# Patient Record
Sex: MALE | Race: WHITE | NOT HISPANIC OR LATINO | Employment: OTHER | URBAN - METROPOLITAN AREA
[De-identification: names, ages, dates, MRNs, and addresses within clinical notes are randomized per-mention and may not be internally consistent; named-entity substitution may affect disease eponyms.]

---

## 2024-08-23 ENCOUNTER — HOSPITAL ENCOUNTER (EMERGENCY)
Facility: HOSPITAL | Age: 69
Discharge: OTHER INSTITUTION | End: 2024-08-24
Attending: STUDENT IN AN ORGANIZED HEALTH CARE EDUCATION/TRAINING PROGRAM
Payer: MEDICARE

## 2024-08-23 ENCOUNTER — APPOINTMENT (OUTPATIENT)
Dept: CARDIOLOGY | Facility: HOSPITAL | Age: 69
End: 2024-08-23
Payer: MEDICARE

## 2024-08-23 ENCOUNTER — APPOINTMENT (OUTPATIENT)
Dept: RADIOLOGY | Facility: HOSPITAL | Age: 69
End: 2024-08-23
Payer: MEDICARE

## 2024-08-23 DIAGNOSIS — U07.1 COVID-19: ICD-10-CM

## 2024-08-23 DIAGNOSIS — G40.901 STATUS EPILEPTICUS (MULTI): Primary | ICD-10-CM

## 2024-08-23 LAB
AMPHETAMINES UR QL SCN: NORMAL
ANION GAP SERPL CALC-SCNC: 12 MMOL/L (ref 10–20)
APPEARANCE UR: CLEAR
BACTERIA #/AREA URNS AUTO: ABNORMAL /HPF
BARBITURATES UR QL SCN: NORMAL
BASOPHILS # BLD AUTO: 0.05 X10*3/UL (ref 0–0.1)
BASOPHILS NFR BLD AUTO: 0.7 %
BENZODIAZ UR QL SCN: NORMAL
BILIRUB UR STRIP.AUTO-MCNC: NEGATIVE MG/DL
BUN SERPL-MCNC: 13 MG/DL (ref 6–23)
BZE UR QL SCN: NORMAL
CALCIUM SERPL-MCNC: 9.4 MG/DL (ref 8.6–10.3)
CANNABINOIDS UR QL SCN: NORMAL
CARDIAC TROPONIN I PNL SERPL HS: 5 NG/L (ref 0–20)
CHLORIDE SERPL-SCNC: 107 MMOL/L (ref 98–107)
CO2 SERPL-SCNC: 25 MMOL/L (ref 21–32)
COLOR UR: YELLOW
CREAT SERPL-MCNC: 1.12 MG/DL (ref 0.5–1.3)
EGFRCR SERPLBLD CKD-EPI 2021: 71 ML/MIN/1.73M*2
EOSINOPHIL # BLD AUTO: 0.27 X10*3/UL (ref 0–0.7)
EOSINOPHIL NFR BLD AUTO: 4 %
ERYTHROCYTE [DISTWIDTH] IN BLOOD BY AUTOMATED COUNT: 13.2 % (ref 11.5–14.5)
ETHANOL SERPL-MCNC: <10 MG/DL
FENTANYL+NORFENTANYL UR QL SCN: NORMAL
GLUCOSE SERPL-MCNC: 87 MG/DL (ref 74–99)
GLUCOSE UR STRIP.AUTO-MCNC: NORMAL MG/DL
HCT VFR BLD AUTO: 42.3 % (ref 41–52)
HGB BLD-MCNC: 13.8 G/DL (ref 13.5–17.5)
HYALINE CASTS #/AREA URNS AUTO: ABNORMAL /LPF
IMM GRANULOCYTES # BLD AUTO: 0.02 X10*3/UL (ref 0–0.7)
IMM GRANULOCYTES NFR BLD AUTO: 0.3 % (ref 0–0.9)
KETONES UR STRIP.AUTO-MCNC: NEGATIVE MG/DL
LACTATE SERPL-SCNC: 1.4 MMOL/L (ref 0.4–2)
LEUKOCYTE ESTERASE UR QL STRIP.AUTO: ABNORMAL
LYMPHOCYTES # BLD AUTO: 2.57 X10*3/UL (ref 1.2–4.8)
LYMPHOCYTES NFR BLD AUTO: 37.7 %
MCH RBC QN AUTO: 28.9 PG (ref 26–34)
MCHC RBC AUTO-ENTMCNC: 32.6 G/DL (ref 32–36)
MCV RBC AUTO: 89 FL (ref 80–100)
METHADONE UR QL SCN: NORMAL
MONOCYTES # BLD AUTO: 0.66 X10*3/UL (ref 0.1–1)
MONOCYTES NFR BLD AUTO: 9.7 %
MUCOUS THREADS #/AREA URNS AUTO: ABNORMAL /LPF
NEUTROPHILS # BLD AUTO: 3.24 X10*3/UL (ref 1.2–7.7)
NEUTROPHILS NFR BLD AUTO: 47.6 %
NITRITE UR QL STRIP.AUTO: NEGATIVE
NRBC BLD-RTO: 0 /100 WBCS (ref 0–0)
OPIATES UR QL SCN: NORMAL
OXYCODONE+OXYMORPHONE UR QL SCN: NORMAL
PCP UR QL SCN: NORMAL
PH UR STRIP.AUTO: 5 [PH]
PLATELET # BLD AUTO: 238 X10*3/UL (ref 150–450)
POTASSIUM SERPL-SCNC: 4.1 MMOL/L (ref 3.5–5.3)
PROT UR STRIP.AUTO-MCNC: NEGATIVE MG/DL
RBC # BLD AUTO: 4.77 X10*6/UL (ref 4.5–5.9)
RBC # UR STRIP.AUTO: NEGATIVE /UL
RBC #/AREA URNS AUTO: ABNORMAL /HPF
SARS-COV-2 RNA RESP QL NAA+PROBE: DETECTED
SODIUM SERPL-SCNC: 140 MMOL/L (ref 136–145)
SP GR UR STRIP.AUTO: 1.02
SQUAMOUS #/AREA URNS AUTO: ABNORMAL /HPF
UROBILINOGEN UR STRIP.AUTO-MCNC: ABNORMAL MG/DL
WBC # BLD AUTO: 6.8 X10*3/UL (ref 4.4–11.3)
WBC #/AREA URNS AUTO: ABNORMAL /HPF

## 2024-08-23 PROCEDURE — 72125 CT NECK SPINE W/O DYE: CPT

## 2024-08-23 PROCEDURE — 84484 ASSAY OF TROPONIN QUANT: CPT | Performed by: STUDENT IN AN ORGANIZED HEALTH CARE EDUCATION/TRAINING PROGRAM

## 2024-08-23 PROCEDURE — 81001 URINALYSIS AUTO W/SCOPE: CPT | Performed by: STUDENT IN AN ORGANIZED HEALTH CARE EDUCATION/TRAINING PROGRAM

## 2024-08-23 PROCEDURE — 85025 COMPLETE CBC W/AUTO DIFF WBC: CPT | Performed by: STUDENT IN AN ORGANIZED HEALTH CARE EDUCATION/TRAINING PROGRAM

## 2024-08-23 PROCEDURE — 36415 COLL VENOUS BLD VENIPUNCTURE: CPT | Performed by: STUDENT IN AN ORGANIZED HEALTH CARE EDUCATION/TRAINING PROGRAM

## 2024-08-23 PROCEDURE — 80048 BASIC METABOLIC PNL TOTAL CA: CPT | Performed by: STUDENT IN AN ORGANIZED HEALTH CARE EDUCATION/TRAINING PROGRAM

## 2024-08-23 PROCEDURE — 70450 CT HEAD/BRAIN W/O DYE: CPT

## 2024-08-23 PROCEDURE — 72125 CT NECK SPINE W/O DYE: CPT | Performed by: RADIOLOGY

## 2024-08-23 PROCEDURE — 99291 CRITICAL CARE FIRST HOUR: CPT | Mod: 25

## 2024-08-23 PROCEDURE — 70450 CT HEAD/BRAIN W/O DYE: CPT | Performed by: RADIOLOGY

## 2024-08-23 PROCEDURE — 96375 TX/PRO/DX INJ NEW DRUG ADDON: CPT

## 2024-08-23 PROCEDURE — 2500000004 HC RX 250 GENERAL PHARMACY W/ HCPCS (ALT 636 FOR OP/ED): Performed by: STUDENT IN AN ORGANIZED HEALTH CARE EDUCATION/TRAINING PROGRAM

## 2024-08-23 PROCEDURE — 99291 CRITICAL CARE FIRST HOUR: CPT | Performed by: STUDENT IN AN ORGANIZED HEALTH CARE EDUCATION/TRAINING PROGRAM

## 2024-08-23 PROCEDURE — 99285 EMERGENCY DEPT VISIT HI MDM: CPT | Mod: 25

## 2024-08-23 PROCEDURE — 80307 DRUG TEST PRSMV CHEM ANLYZR: CPT | Performed by: STUDENT IN AN ORGANIZED HEALTH CARE EDUCATION/TRAINING PROGRAM

## 2024-08-23 PROCEDURE — 96372 THER/PROPH/DIAG INJ SC/IM: CPT | Performed by: STUDENT IN AN ORGANIZED HEALTH CARE EDUCATION/TRAINING PROGRAM

## 2024-08-23 PROCEDURE — 93005 ELECTROCARDIOGRAM TRACING: CPT

## 2024-08-23 PROCEDURE — 96374 THER/PROPH/DIAG INJ IV PUSH: CPT

## 2024-08-23 PROCEDURE — 83605 ASSAY OF LACTIC ACID: CPT | Performed by: STUDENT IN AN ORGANIZED HEALTH CARE EDUCATION/TRAINING PROGRAM

## 2024-08-23 PROCEDURE — 82077 ASSAY SPEC XCP UR&BREATH IA: CPT | Performed by: STUDENT IN AN ORGANIZED HEALTH CARE EDUCATION/TRAINING PROGRAM

## 2024-08-23 PROCEDURE — 87086 URINE CULTURE/COLONY COUNT: CPT | Mod: GEALAB | Performed by: STUDENT IN AN ORGANIZED HEALTH CARE EDUCATION/TRAINING PROGRAM

## 2024-08-23 PROCEDURE — 87635 SARS-COV-2 COVID-19 AMP PRB: CPT | Performed by: STUDENT IN AN ORGANIZED HEALTH CARE EDUCATION/TRAINING PROGRAM

## 2024-08-23 RX ORDER — LEVETIRACETAM 10 MG/ML
1000 INJECTION INTRAVASCULAR ONCE
Status: COMPLETED | OUTPATIENT
Start: 2024-08-23 | End: 2024-08-23

## 2024-08-23 RX ORDER — LORAZEPAM 2 MG/ML
INJECTION INTRAMUSCULAR
Status: DISCONTINUED
Start: 2024-08-23 | End: 2024-08-24 | Stop reason: HOSPADM

## 2024-08-23 RX ORDER — LORAZEPAM 2 MG/ML
2 INJECTION INTRAMUSCULAR ONCE
Status: COMPLETED | OUTPATIENT
Start: 2024-08-23 | End: 2024-08-23

## 2024-08-23 RX ADMIN — LORAZEPAM 2 MG: 2 INJECTION INTRAMUSCULAR; INTRAVENOUS at 21:32

## 2024-08-23 RX ADMIN — LORAZEPAM 2 MG: 2 INJECTION INTRAMUSCULAR; INTRAVENOUS at 21:54

## 2024-08-23 RX ADMIN — LEVETIRACETAM 1000 MG: 10 INJECTION INTRAVENOUS at 21:52

## 2024-08-23 ASSESSMENT — COLUMBIA-SUICIDE SEVERITY RATING SCALE - C-SSRS
2. HAVE YOU ACTUALLY HAD ANY THOUGHTS OF KILLING YOURSELF?: NO
6. HAVE YOU EVER DONE ANYTHING, STARTED TO DO ANYTHING, OR PREPARED TO DO ANYTHING TO END YOUR LIFE?: NO
1. IN THE PAST MONTH, HAVE YOU WISHED YOU WERE DEAD OR WISHED YOU COULD GO TO SLEEP AND NOT WAKE UP?: NO

## 2024-08-23 ASSESSMENT — PAIN SCALES - GENERAL: PAINLEVEL_OUTOF10: 0 - NO PAIN

## 2024-08-23 ASSESSMENT — PAIN - FUNCTIONAL ASSESSMENT: PAIN_FUNCTIONAL_ASSESSMENT: 0-10

## 2024-08-24 ENCOUNTER — APPOINTMENT (OUTPATIENT)
Dept: CARDIOLOGY | Facility: HOSPITAL | Age: 69
DRG: 100 | End: 2024-08-24
Payer: MEDICARE

## 2024-08-24 ENCOUNTER — APPOINTMENT (OUTPATIENT)
Dept: RADIOLOGY | Facility: HOSPITAL | Age: 69
DRG: 100 | End: 2024-08-24
Payer: MEDICARE

## 2024-08-24 ENCOUNTER — HOSPITAL ENCOUNTER (INPATIENT)
Facility: HOSPITAL | Age: 69
DRG: 100 | End: 2024-08-24
Attending: PSYCHIATRY & NEUROLOGY | Admitting: HOSPITALIST
Payer: MEDICARE

## 2024-08-24 VITALS
WEIGHT: 291.01 LBS | BODY MASS INDEX: 43.1 KG/M2 | HEIGHT: 69 IN | OXYGEN SATURATION: 100 % | SYSTOLIC BLOOD PRESSURE: 114 MMHG | DIASTOLIC BLOOD PRESSURE: 62 MMHG | TEMPERATURE: 96.4 F | RESPIRATION RATE: 15 BRPM | HEART RATE: 59 BPM

## 2024-08-24 DIAGNOSIS — I50.9 CONGESTIVE HEART FAILURE, UNSPECIFIED HF CHRONICITY, UNSPECIFIED HEART FAILURE TYPE (MULTI): ICD-10-CM

## 2024-08-24 DIAGNOSIS — R56.9 SEIZURE (MULTI): Primary | ICD-10-CM

## 2024-08-24 PROBLEM — I48.91 ATRIAL FIBRILLATION (MULTI): Status: ACTIVE | Noted: 2017-02-09

## 2024-08-24 PROBLEM — M54.12 CERVICAL RADICULOPATHY: Status: ACTIVE | Noted: 2021-08-12

## 2024-08-24 PROBLEM — Z90.3 HISTORY OF GASTRECTOMY: Status: ACTIVE | Noted: 2018-06-07

## 2024-08-24 PROBLEM — M54.16 LUMBAR RADICULOPATHY: Status: ACTIVE | Noted: 2021-08-12

## 2024-08-24 PROBLEM — F44.9 DISSOCIATIVE DISORDER: Status: ACTIVE | Noted: 2018-07-29

## 2024-08-24 PROBLEM — D47.2 MONOCLONAL GAMMOPATHY OF UNKNOWN SIGNIFICANCE (MGUS): Status: ACTIVE | Noted: 2024-08-24

## 2024-08-24 LAB
ANION GAP BLDV CALCULATED.4IONS-SCNC: 7 MMOL/L (ref 10–25)
BASE EXCESS BLDV CALC-SCNC: 0.7 MMOL/L (ref -2–3)
BASOPHILS # BLD AUTO: 0.05 X10*3/UL (ref 0–0.1)
BASOPHILS NFR BLD AUTO: 1 %
BODY TEMPERATURE: 37 DEGREES CELSIUS
CA-I BLDV-SCNC: 1.15 MMOL/L (ref 1.1–1.33)
CHLORIDE BLDV-SCNC: 110 MMOL/L (ref 98–107)
EOSINOPHIL # BLD AUTO: 0.22 X10*3/UL (ref 0–0.7)
EOSINOPHIL NFR BLD AUTO: 4.5 %
ERYTHROCYTE [DISTWIDTH] IN BLOOD BY AUTOMATED COUNT: 13.2 % (ref 11.5–14.5)
GLUCOSE BLDV-MCNC: 74 MG/DL (ref 74–99)
HCO3 BLDV-SCNC: 28.2 MMOL/L (ref 22–26)
HCT VFR BLD AUTO: 44.1 % (ref 41–52)
HCT VFR BLD EST: 42 % (ref 41–52)
HGB BLD-MCNC: 14 G/DL (ref 13.5–17.5)
HGB BLDV-MCNC: 14.1 G/DL (ref 13.5–17.5)
HOLD SPECIMEN: NORMAL
IMM GRANULOCYTES # BLD AUTO: 0 X10*3/UL (ref 0–0.7)
IMM GRANULOCYTES NFR BLD AUTO: 0 % (ref 0–0.9)
INHALED O2 CONCENTRATION: 21 %
INR PPP: 1 (ref 0.9–1.1)
LACTATE BLDV-SCNC: 2.5 MMOL/L (ref 0.4–2)
LYMPHOCYTES # BLD AUTO: 1.83 X10*3/UL (ref 1.2–4.8)
LYMPHOCYTES NFR BLD AUTO: 37 %
MCH RBC QN AUTO: 29.3 PG (ref 26–34)
MCHC RBC AUTO-ENTMCNC: 31.7 G/DL (ref 32–36)
MCV RBC AUTO: 92 FL (ref 80–100)
MONOCYTES # BLD AUTO: 0.62 X10*3/UL (ref 0.1–1)
MONOCYTES NFR BLD AUTO: 12.6 %
NEUTROPHILS # BLD AUTO: 2.22 X10*3/UL (ref 1.2–7.7)
NEUTROPHILS NFR BLD AUTO: 44.9 %
NRBC BLD-RTO: 0 /100 WBCS (ref 0–0)
OXYHGB MFR BLDV: 44.2 % (ref 45–75)
PCO2 BLDV: 56 MM HG (ref 41–51)
PH BLDV: 7.31 PH (ref 7.33–7.43)
PLATELET # BLD AUTO: 174 X10*3/UL (ref 150–450)
PO2 BLDV: 31 MM HG (ref 35–45)
POTASSIUM BLDV-SCNC: 3.9 MMOL/L (ref 3.5–5.3)
PROTHROMBIN TIME: 11.5 SECONDS (ref 9.8–12.8)
RBC # BLD AUTO: 4.78 X10*6/UL (ref 4.5–5.9)
SAO2 % BLDV: 45 % (ref 45–75)
SODIUM BLDV-SCNC: 141 MMOL/L (ref 136–145)
WBC # BLD AUTO: 4.9 X10*3/UL (ref 4.4–11.3)

## 2024-08-24 PROCEDURE — 2500000004 HC RX 250 GENERAL PHARMACY W/ HCPCS (ALT 636 FOR OP/ED): Performed by: STUDENT IN AN ORGANIZED HEALTH CARE EDUCATION/TRAINING PROGRAM

## 2024-08-24 PROCEDURE — 71045 X-RAY EXAM CHEST 1 VIEW: CPT

## 2024-08-24 PROCEDURE — 2500000005 HC RX 250 GENERAL PHARMACY W/O HCPCS: Mod: JZ | Performed by: HOSPITALIST

## 2024-08-24 PROCEDURE — 2500000002 HC RX 250 W HCPCS SELF ADMINISTERED DRUGS (ALT 637 FOR MEDICARE OP, ALT 636 FOR OP/ED): Performed by: STUDENT IN AN ORGANIZED HEALTH CARE EDUCATION/TRAINING PROGRAM

## 2024-08-24 PROCEDURE — 71045 X-RAY EXAM CHEST 1 VIEW: CPT | Performed by: RADIOLOGY

## 2024-08-24 PROCEDURE — 96376 TX/PRO/DX INJ SAME DRUG ADON: CPT

## 2024-08-24 PROCEDURE — 1200000002 HC GENERAL ROOM WITH TELEMETRY DAILY

## 2024-08-24 PROCEDURE — 36415 COLL VENOUS BLD VENIPUNCTURE: CPT | Performed by: HOSPITALIST

## 2024-08-24 PROCEDURE — 2500000004 HC RX 250 GENERAL PHARMACY W/ HCPCS (ALT 636 FOR OP/ED): Performed by: HOSPITALIST

## 2024-08-24 PROCEDURE — 99222 1ST HOSP IP/OBS MODERATE 55: CPT | Performed by: HOSPITALIST

## 2024-08-24 PROCEDURE — 93010 ELECTROCARDIOGRAM REPORT: CPT | Performed by: INTERNAL MEDICINE

## 2024-08-24 PROCEDURE — 99223 1ST HOSP IP/OBS HIGH 75: CPT | Performed by: PSYCHIATRY & NEUROLOGY

## 2024-08-24 PROCEDURE — 85610 PROTHROMBIN TIME: CPT | Performed by: HOSPITALIST

## 2024-08-24 PROCEDURE — 93005 ELECTROCARDIOGRAM TRACING: CPT

## 2024-08-24 PROCEDURE — 2500000001 HC RX 250 WO HCPCS SELF ADMINISTERED DRUGS (ALT 637 FOR MEDICARE OP): Performed by: STUDENT IN AN ORGANIZED HEALTH CARE EDUCATION/TRAINING PROGRAM

## 2024-08-24 PROCEDURE — 94660 CPAP INITIATION&MGMT: CPT

## 2024-08-24 PROCEDURE — 85025 COMPLETE CBC W/AUTO DIFF WBC: CPT | Performed by: HOSPITALIST

## 2024-08-24 PROCEDURE — XW033E5 INTRODUCTION OF REMDESIVIR ANTI-INFECTIVE INTO PERIPHERAL VEIN, PERCUTANEOUS APPROACH, NEW TECHNOLOGY GROUP 5: ICD-10-PCS | Performed by: HOSPITALIST

## 2024-08-24 PROCEDURE — 2500000001 HC RX 250 WO HCPCS SELF ADMINISTERED DRUGS (ALT 637 FOR MEDICARE OP): Performed by: HOSPITALIST

## 2024-08-24 PROCEDURE — 82435 ASSAY OF BLOOD CHLORIDE: CPT | Performed by: HOSPITALIST

## 2024-08-24 RX ORDER — FLUTICASONE PROPIONATE AND SALMETEROL 250; 50 UG/1; UG/1
1 POWDER RESPIRATORY (INHALATION)
COMMUNITY
End: 2024-09-02 | Stop reason: HOSPADM

## 2024-08-24 RX ORDER — VENLAFAXINE HYDROCHLORIDE 150 MG/1
150 CAPSULE, EXTENDED RELEASE ORAL DAILY
Status: ON HOLD | COMMUNITY

## 2024-08-24 RX ORDER — AMLODIPINE BESYLATE 2.5 MG/1
2.5 TABLET ORAL DAILY
Status: ON HOLD | COMMUNITY

## 2024-08-24 RX ORDER — SERTRALINE HYDROCHLORIDE 100 MG/1
150 TABLET, FILM COATED ORAL NIGHTLY
COMMUNITY
End: 2024-09-02 | Stop reason: HOSPADM

## 2024-08-24 RX ORDER — FAMOTIDINE 20 MG/1
20 TABLET, FILM COATED ORAL 2 TIMES DAILY
Status: ON HOLD | COMMUNITY

## 2024-08-24 RX ORDER — ASPIRIN 81 MG/1
81 TABLET ORAL DAILY
Status: ON HOLD | COMMUNITY

## 2024-08-24 RX ORDER — POTASSIUM CHLORIDE 750 MG/1
20 TABLET, FILM COATED, EXTENDED RELEASE ORAL DAILY
COMMUNITY
End: 2024-09-02 | Stop reason: HOSPADM

## 2024-08-24 RX ORDER — LAMOTRIGINE 150 MG/1
150 TABLET ORAL 2 TIMES DAILY
Status: ON HOLD | COMMUNITY

## 2024-08-24 RX ORDER — FUROSEMIDE 20 MG/1
20 TABLET ORAL DAILY
COMMUNITY
End: 2024-09-02 | Stop reason: HOSPADM

## 2024-08-24 RX ORDER — QUETIAPINE FUMARATE 25 MG/1
25 TABLET, FILM COATED ORAL NIGHTLY
Status: ON HOLD | COMMUNITY

## 2024-08-24 RX ORDER — PANTOPRAZOLE SODIUM 40 MG/1
40 TABLET, DELAYED RELEASE ORAL
Status: ON HOLD | COMMUNITY

## 2024-08-24 RX ORDER — ACETAMINOPHEN 325 MG/1
650 TABLET ORAL EVERY 4 HOURS PRN
Status: DISCONTINUED | OUTPATIENT
Start: 2024-08-24 | End: 2024-09-02 | Stop reason: HOSPADM

## 2024-08-24 RX ORDER — DULOXETIN HYDROCHLORIDE 30 MG/1
1 CAPSULE, DELAYED RELEASE ORAL DAILY
Status: ON HOLD | COMMUNITY

## 2024-08-24 RX ORDER — MIRTAZAPINE 7.5 MG/1
7.5 TABLET, FILM COATED ORAL NIGHTLY
Status: ON HOLD | COMMUNITY

## 2024-08-24 RX ORDER — ROPINIROLE 1 MG/1
8 TABLET, FILM COATED ORAL NIGHTLY
Status: DISCONTINUED | OUTPATIENT
Start: 2024-08-24 | End: 2024-09-02 | Stop reason: HOSPADM

## 2024-08-24 RX ORDER — ACETAMINOPHEN 650 MG/1
650 SUPPOSITORY RECTAL EVERY 4 HOURS PRN
Status: DISCONTINUED | OUTPATIENT
Start: 2024-08-24 | End: 2024-09-02 | Stop reason: HOSPADM

## 2024-08-24 RX ORDER — LEVETIRACETAM 5 MG/ML
500 INJECTION INTRAVASCULAR EVERY 12 HOURS
Status: DISCONTINUED | OUTPATIENT
Start: 2024-08-24 | End: 2024-08-25 | Stop reason: ALTCHOICE

## 2024-08-24 RX ORDER — LEVETIRACETAM 5 MG/ML
500 INJECTION INTRAVASCULAR EVERY 12 HOURS
Status: DISCONTINUED | OUTPATIENT
Start: 2024-08-24 | End: 2024-08-24 | Stop reason: HOSPADM

## 2024-08-24 RX ORDER — DONEPEZIL HYDROCHLORIDE 10 MG/1
10 TABLET, FILM COATED ORAL NIGHTLY
Status: ON HOLD | COMMUNITY

## 2024-08-24 RX ORDER — ROPINIROLE 4 MG/1
8 TABLET, FILM COATED ORAL NIGHTLY
Status: ON HOLD | COMMUNITY

## 2024-08-24 RX ORDER — ENOXAPARIN SODIUM 100 MG/ML
40 INJECTION SUBCUTANEOUS EVERY 12 HOURS SCHEDULED
Status: DISCONTINUED | OUTPATIENT
Start: 2024-08-24 | End: 2024-08-25

## 2024-08-24 RX ORDER — RIVAROXABAN 20 MG/1
20 TABLET, FILM COATED ORAL
Status: ON HOLD | COMMUNITY

## 2024-08-24 RX ORDER — ACETAMINOPHEN 160 MG/5ML
650 SOLUTION ORAL EVERY 4 HOURS PRN
Status: DISCONTINUED | OUTPATIENT
Start: 2024-08-24 | End: 2024-09-02 | Stop reason: HOSPADM

## 2024-08-24 RX ORDER — CARVEDILOL 3.12 MG/1
3.12 TABLET ORAL 2 TIMES DAILY
COMMUNITY
End: 2024-09-02 | Stop reason: HOSPADM

## 2024-08-24 RX ORDER — ATORVASTATIN CALCIUM 40 MG/1
40 TABLET, FILM COATED ORAL DAILY
Status: ON HOLD | COMMUNITY

## 2024-08-24 RX ORDER — QUETIAPINE FUMARATE 25 MG/1
50 TABLET, FILM COATED ORAL ONCE
Status: COMPLETED | OUTPATIENT
Start: 2024-08-24 | End: 2024-08-24

## 2024-08-24 RX ORDER — ZONISAMIDE 100 MG/1
100 CAPSULE ORAL DAILY
Status: ON HOLD | COMMUNITY

## 2024-08-24 RX ORDER — ROPINIROLE 4 MG/1
4 TABLET, FILM COATED ORAL ONCE
Status: COMPLETED | OUTPATIENT
Start: 2024-08-24 | End: 2024-08-24

## 2024-08-24 RX ADMIN — QUETIAPINE FUMARATE 50 MG: 25 TABLET ORAL at 00:39

## 2024-08-24 RX ADMIN — ROPINIROLE 4 MG: 4 TABLET, FILM COATED ORAL at 03:45

## 2024-08-24 RX ADMIN — LEVETIRACETAM 500 MG: 5 INJECTION INTRAVENOUS at 20:46

## 2024-08-24 RX ADMIN — ENOXAPARIN SODIUM 40 MG: 40 INJECTION SUBCUTANEOUS at 20:46

## 2024-08-24 RX ADMIN — ROPINIROLE HYDROCHLORIDE 8 MG: 1 TABLET, FILM COATED ORAL at 20:46

## 2024-08-24 RX ADMIN — ENOXAPARIN SODIUM 40 MG: 40 INJECTION SUBCUTANEOUS at 13:22

## 2024-08-24 RX ADMIN — REMDESIVIR 200 MG: 100 INJECTION, POWDER, LYOPHILIZED, FOR SOLUTION INTRAVENOUS at 13:23

## 2024-08-24 RX ADMIN — LEVETIRACETAM 500 MG: 5 INJECTION INTRAVENOUS at 08:45

## 2024-08-24 SDOH — SOCIAL STABILITY: SOCIAL INSECURITY: DO YOU FEEL ANYONE HAS EXPLOITED OR TAKEN ADVANTAGE OF YOU FINANCIALLY OR OF YOUR PERSONAL PROPERTY?: NO

## 2024-08-24 SDOH — SOCIAL STABILITY: SOCIAL INSECURITY: ARE THERE ANY APPARENT SIGNS OF INJURIES/BEHAVIORS THAT COULD BE RELATED TO ABUSE/NEGLECT?: NO

## 2024-08-24 SDOH — SOCIAL STABILITY: SOCIAL INSECURITY: ABUSE: ADULT

## 2024-08-24 SDOH — SOCIAL STABILITY: SOCIAL INSECURITY: HAS ANYONE EVER THREATENED TO HURT YOUR FAMILY OR YOUR PETS?: NO

## 2024-08-24 SDOH — SOCIAL STABILITY: SOCIAL INSECURITY: HAVE YOU HAD THOUGHTS OF HARMING ANYONE ELSE?: NO

## 2024-08-24 SDOH — SOCIAL STABILITY: SOCIAL INSECURITY: DO YOU FEEL UNSAFE GOING BACK TO THE PLACE WHERE YOU ARE LIVING?: NO

## 2024-08-24 SDOH — SOCIAL STABILITY: SOCIAL INSECURITY: ARE YOU OR HAVE YOU BEEN THREATENED OR ABUSED PHYSICALLY, EMOTIONALLY, OR SEXUALLY BY ANYONE?: NO

## 2024-08-24 SDOH — SOCIAL STABILITY: SOCIAL INSECURITY: DOES ANYONE TRY TO KEEP YOU FROM HAVING/CONTACTING OTHER FRIENDS OR DOING THINGS OUTSIDE YOUR HOME?: NO

## 2024-08-24 SDOH — SOCIAL STABILITY: SOCIAL INSECURITY: HAVE YOU HAD ANY THOUGHTS OF HARMING ANYONE ELSE?: NO

## 2024-08-24 ASSESSMENT — COGNITIVE AND FUNCTIONAL STATUS - GENERAL
STANDING UP FROM CHAIR USING ARMS: A LOT
EATING MEALS: A LITTLE
EATING MEALS: A LITTLE
STANDING UP FROM CHAIR USING ARMS: A LOT
CLIMB 3 TO 5 STEPS WITH RAILING: A LOT
MOVING TO AND FROM BED TO CHAIR: A LITTLE
WALKING IN HOSPITAL ROOM: A LOT
MOBILITY SCORE: 15
DRESSING REGULAR LOWER BODY CLOTHING: A LITTLE
CLIMB 3 TO 5 STEPS WITH RAILING: TOTAL
EATING MEALS: A LITTLE
DAILY ACTIVITIY SCORE: 18
HELP NEEDED FOR BATHING: A LITTLE
PERSONAL GROOMING: A LITTLE
STANDING UP FROM CHAIR USING ARMS: A LOT
MOVING TO AND FROM BED TO CHAIR: A LITTLE
PATIENT BASELINE BEDBOUND: NO
MOBILITY SCORE: 15
TOILETING: A LITTLE
TURNING FROM BACK TO SIDE WHILE IN FLAT BAD: A LITTLE
MOBILITY SCORE: 16
DRESSING REGULAR LOWER BODY CLOTHING: A LITTLE
DAILY ACTIVITIY SCORE: 18
WALKING IN HOSPITAL ROOM: A LOT
DRESSING REGULAR UPPER BODY CLOTHING: A LITTLE
TURNING FROM BACK TO SIDE WHILE IN FLAT BAD: A LITTLE
WALKING IN HOSPITAL ROOM: A LOT
DAILY ACTIVITIY SCORE: 18
PERSONAL GROOMING: A LITTLE
DRESSING REGULAR UPPER BODY CLOTHING: A LITTLE
TOILETING: A LITTLE
PERSONAL GROOMING: A LITTLE
DRESSING REGULAR LOWER BODY CLOTHING: A LITTLE
HELP NEEDED FOR BATHING: A LITTLE
HELP NEEDED FOR BATHING: A LITTLE
TURNING FROM BACK TO SIDE WHILE IN FLAT BAD: A LITTLE
CLIMB 3 TO 5 STEPS WITH RAILING: TOTAL
TOILETING: A LITTLE
DRESSING REGULAR UPPER BODY CLOTHING: A LITTLE
MOVING TO AND FROM BED TO CHAIR: A LITTLE

## 2024-08-24 ASSESSMENT — LIFESTYLE VARIABLES
HOW OFTEN DO YOU HAVE A DRINK CONTAINING ALCOHOL: NEVER
SKIP TO QUESTIONS 9-10: 1
HOW OFTEN DO YOU HAVE 6 OR MORE DRINKS ON ONE OCCASION: NEVER
AUDIT-C TOTAL SCORE: 0
AUDIT-C TOTAL SCORE: 0
HOW MANY STANDARD DRINKS CONTAINING ALCOHOL DO YOU HAVE ON A TYPICAL DAY: PATIENT DOES NOT DRINK

## 2024-08-24 ASSESSMENT — ACTIVITIES OF DAILY LIVING (ADL)
FEEDING YOURSELF: NEEDS ASSISTANCE
PATIENT'S MEMORY ADEQUATE TO SAFELY COMPLETE DAILY ACTIVITIES?: NO
ADEQUATE_TO_COMPLETE_ADL: YES
ASSISTIVE_DEVICE: WHEELCHAIR
WALKS IN HOME: NEEDS ASSISTANCE
JUDGMENT_ADEQUATE_SAFELY_COMPLETE_DAILY_ACTIVITIES: NO
DRESSING YOURSELF: NEEDS ASSISTANCE
HEARING - LEFT EAR: FUNCTIONAL
BATHING: NEEDS ASSISTANCE
GROOMING: NEEDS ASSISTANCE
TOILETING: NEEDS ASSISTANCE
HEARING - RIGHT EAR: FUNCTIONAL

## 2024-08-24 ASSESSMENT — PAIN SCALES - GENERAL
PAINLEVEL_OUTOF10: 0 - NO PAIN
PAINLEVEL_OUTOF10: 0 - NO PAIN

## 2024-08-24 ASSESSMENT — PAIN - FUNCTIONAL ASSESSMENT
PAIN_FUNCTIONAL_ASSESSMENT: 0-10
PAIN_FUNCTIONAL_ASSESSMENT: 0-10

## 2024-08-24 ASSESSMENT — COLUMBIA-SUICIDE SEVERITY RATING SCALE - C-SSRS
6. HAVE YOU EVER DONE ANYTHING, STARTED TO DO ANYTHING, OR PREPARED TO DO ANYTHING TO END YOUR LIFE?: NO
2. HAVE YOU ACTUALLY HAD ANY THOUGHTS OF KILLING YOURSELF?: NO
1. IN THE PAST MONTH, HAVE YOU WISHED YOU WERE DEAD OR WISHED YOU COULD GO TO SLEEP AND NOT WAKE UP?: NO

## 2024-08-24 ASSESSMENT — PATIENT HEALTH QUESTIONNAIRE - PHQ9
2. FEELING DOWN, DEPRESSED OR HOPELESS: NOT AT ALL
SUM OF ALL RESPONSES TO PHQ9 QUESTIONS 1 & 2: 0
1. LITTLE INTEREST OR PLEASURE IN DOING THINGS: NOT AT ALL

## 2024-08-24 NOTE — CONSULTS
"Pharmacy Medication History Review    Shamir Buenrostro is a 69 y.o. male admitted for Seizure (Multi). Pharmacy reviewed the patient's [id number]-dhqirqyew medications and allergies for accuracy.    Per patient's wife and brother, patient self discontinued medications \"a long time ago\" because he did not feel they worked nor helped. His brother reported that the only medication he was still taking was ropinirole for restless leg syndrome. Compared list with outpatient PCP notes and refill hx. Prescriptions are still being refilled and delivered by pharmacy, but patient is not taking.    The list below reflectives the updated PTA list. Please review each medication in order reconciliation for additional clarification and justification.  Medications Prior to Admission   Medication Sig Taking?    amLODIPine (Norvasc) 2.5 mg tablet Take 1 tablet (2.5 mg) by mouth once daily. No    aspirin 81 mg EC tablet Take 1 tablet (81 mg) by mouth once daily. No    atorvastatin (Lipitor) 40 mg tablet Take 1 tablet (40 mg) by mouth once daily. No    carvedilol (Coreg) 3.125 mg tablet Take 1 tablet (3.125 mg) by mouth 2 times a day. No    donepezil (Aricept) 10 mg tablet Take 1 tablet (10 mg) by mouth once daily at bedtime. No    DULoxetine (Cymbalta) 30 mg DR capsule Take 1 capsule (30 mg) by mouth once daily. No    famotidine (Pepcid) 20 mg tablet Take 1 tablet (20 mg) by mouth 2 times a day. No    fluticasone propion-salmeteroL (Advair Diskus) 250-50 mcg/dose diskus inhaler Inhale 1 puff 2 times a day. Rinse mouth with water after use to reduce aftertaste and incidence of candidiasis. Do not swallow. No    furosemide (Lasix) 20 mg tablet Take 1 tablet (20 mg) by mouth once daily. No    lamoTRIgine (LaMICtal) 150 mg tablet Take 1 tablet (150 mg) by mouth 2 times a day. No    mirtazapine (Remeron) 7.5 mg tablet Take 1 tablet (7.5 mg) by mouth once daily at bedtime. No    pantoprazole (ProtoNix) 40 mg EC tablet Take 1 tablet (40 mg) by " mouth once daily in the morning. Take before meals. No    potassium chloride CR 10 mEq ER tablet Take 2 tablets (20 mEq) by mouth once daily. Do not crush, chew, or split. No    QUEtiapine (SEROquel) 25 mg tablet Take 1 tablet (25 mg) by mouth once daily at bedtime. No    rOPINIRole (Requip) 4 mg tablet Take 2 tablets (8 mg) by mouth once daily at bedtime. Yes    sertraline (Zoloft) 100 mg tablet Take 1.5 tablets (150 mg) by mouth once daily at bedtime. No    venlafaxine XR (Effexor XR) 150 mg 24 hr capsule Take 1 capsule (150 mg) by mouth once daily. No    Xarelto 20 mg tablet Take 1 tablet (20 mg) by mouth once daily in the evening. Take with meals. No    zonisamide (Zonegran) 100 mg capsule Take 1 capsule (100 mg) by mouth once daily. No        The list below reflectives the updated allergy list. Please review each documented allergy for additional clarification and justification.  Allergies  Indicated as Unable to Assess by Pedro Luis Dawson DO on 8/24/2024 (Patient unable to communicate)        Severity Reactions Comments    Adhesive Tape-silicones Not Specified Unknown     Morphine Not Specified Unknown     Testosterone Not Specified Unknown        Mell Salinas, SidD

## 2024-08-24 NOTE — H&P
"Between 7AM-7PM please message me via Epic Secure Chat.  After 7PM please page Nocturnist on call.    [facility] History and Physical    Shamir Buenrostro    :  1955(69 y.o.)    MRN:  [id number]  Date: 24     Assessment and Plan:      Seizure with post ictal state  COVID 19 without hypoxia  Reported Brain Tumor  Sinus bradycardia  RLS  PAF  CHF- EF unknown  COPD    Plan:  - Neurology consulted. IV keppra, cvEEG. Neurology recommends MRI Brain but need to sort out of device seen on CXR is a leadless pacemaker or loop recorder (patient reported hx of pacemaker at one point then states he's not sure if he has pacemaker). If pacemaker will need tx to [facility] for [place]  - Start remdesivir for COVID with multiple comorbidities. Does not need dexa  - Hold on any other home meds as wife reports not taking any meds; will re-initiate as needed and once records verified    DVT Prophylaxis: subcutaneous Lovenox      BMI Classification: 42.97 - morbid obesity BMI 40 or >    Disposition: Admit to inpatient,  await clinical improvement and treatment response, and await consultant recommendations    Electronically signed by Pedro Luis Dawson DO on 24 at 10:29 PM     Subjective:      Chief Complaint: seizure  PCP: No Assigned PCP Generic Provider, MD     HPI:    Shamir Buenrostro is a 69 y.o. male who presented to [facility] ED for evaluation of seizures at home. Patient had multiple seizures some lasting 15 minutes some lasting a few minutes by EMS and at home.     Spoke with patient's wife who is a poor historian and has limited insight into his medical history. Patient is from [place], [place]. He has history of seizures but not been on his meds for long time. Not taking any medicines at home for \"long long time.\" Patient with pacemaker but he nor his wife know brand, when it was placed or why it was placed.    Patient apparently has history of brain cancer but is not on any chemo and refusing any treatment for " his brain cancer.     Patient tested positive for COVID 19 in ER but per wife he has been asymptomatic.     Dr. Bains is his primary care doctor in [place], [place]. Attempted to call office but got busy tone at both number given to me by family and number found on google.       Past Medical History:   Diagnosis Date    Brain tumor (Multi)        No past surgical history on file.    No family history on file.    Social History     Socioeconomic History    Marital status:      Spouse name: Not on file    Number of children: Not on file    Years of education: Not on file    Highest education level: Not on file   Occupational History    Not on file   Tobacco Use    Smoking status: Not on file    Smokeless tobacco: Not on file   Substance and Sexual Activity    Alcohol use: Not on file    Drug use: Not on file    Sexual activity: Not on file   Other Topics Concern    Not on file   Social History Narrative    Not on file     Social Determinants of Health     Financial Resource Strain: Not on file   Food Insecurity: Not on file   Transportation Needs: Not on file   Physical Activity: Not on file   Stress: Not on file   Social Connections: Not on file   Intimate Partner Violence: Not on file   Housing Stability: Not on file       Allergies   Allergen Reactions    Adhesive Tape-Silicones Unknown    Morphine Unknown    Testosterone Unknown       Prior to Admission medications    Medication Sig Start Date End Date Taking? Authorizing Provider   amLODIPine (Norvasc) 2.5 mg tablet Take 1 tablet (2.5 mg) by mouth once daily.    Historical Provider, MD   aspirin 81 mg EC tablet Take 1 tablet (81 mg) by mouth once daily.    Historical Provider, MD   atorvastatin (Lipitor) 40 mg tablet Take 1 tablet (40 mg) by mouth once daily.    Historical Provider, MD   donepezil (Aricept) 10 mg tablet Take 1 tablet (10 mg) by mouth once daily at bedtime.    Historical Provider, MD   DULoxetine (Cymbalta) 30 mg DR capsule Take 1 capsule (30  mg) by mouth once daily.    Historical Provider, MD   famotidine (Pepcid) 20 mg tablet Take 1 tablet (20 mg) by mouth 2 times a day.    Historical Provider, MD   lamoTRIgine (LaMICtal) 150 mg tablet Take 1 tablet (150 mg) by mouth once daily.    Historical Provider, MD   mirtazapine (Remeron) 7.5 mg tablet Take 1 tablet (7.5 mg) by mouth once daily at bedtime.    Historical Provider, MD   pantoprazole (ProtoNix) 40 mg EC tablet Take 1 tablet (40 mg) by mouth once daily in the morning. Take before meals.    Historical Provider, MD   QUEtiapine (SEROquel) 25 mg tablet Take 2 tablets (50 mg) by mouth once daily at bedtime.    Historical Provider, MD   rOPINIRole (Requip) 4 mg tablet Take 1 tablet (4 mg) by mouth once daily at bedtime.    Historical Provider, MD   venlafaxine XR (Effexor XR) 150 mg 24 hr capsule Take 1 capsule (150 mg) by mouth once daily.    Historical Provider, MD   Xarelto 20 mg tablet Take 1 tablet (20 mg) by mouth once daily in the evening. Take with meals.    Historical Provider, MD   zonisamide (Zonegran) 100 mg capsule Take 1 capsule (100 mg) by mouth once daily.    Historical Provider, MD       Review of systems:   Other than patient's chronic conditions and those complaints in the history above, the rest of the 10 systems review were done and were negative.     Objective:      Vitals:    08/24/24 1126 08/24/24 1345 08/24/24 1600 08/24/24 2103   BP:   151/89 108/71   BP Location:   Right arm Left arm   Patient Position:   Lying Lying   Pulse: (!) 48  57 (!) 49   Resp:  16 16 16   Temp:   36.7 °C (98.1 °F) 36.2 °C (97.1 °F)   TempSrc:   Temporal Temporal   SpO2:  99% 100% 100%        Physical Exam  Vitals and nursing note reviewed.   HENT:      Mouth/Throat:      Mouth: Mucous membranes are moist.      Pharynx: Oropharynx is clear.   Cardiovascular:      Rate and Rhythm: Regular rhythm. Bradycardia present.   Pulmonary:      Effort: Pulmonary effort is normal.   Abdominal:      General: There is  no distension.      Palpations: Abdomen is soft.      Tenderness: There is no abdominal tenderness.   Neurological:      Mental Status: He is alert and oriented to person, place, and time.      Cranial Nerves: No cranial nerve deficit.      Motor: No weakness.       Initially seen in AM at time of transfer. Very drowsy and lethargic. Seen again in afternoon and he was Aox3 and able to provide a little bit more history.   Labs:   Lab Results   Component Value Date     08/23/2024    K 4.1 08/23/2024     08/23/2024    CO2 25 08/23/2024    BUN 13 08/23/2024    CREATININE 1.12 08/23/2024    GLUCOSE 87 08/23/2024    CALCIUM 9.4 08/23/2024       Lab Results   Component Value Date    WBC 4.9 08/24/2024    HGB 14.0 08/24/2024    HCT 44.1 08/24/2024    MCV 92 08/24/2024     08/24/2024       Imaging:   No results found.

## 2024-08-24 NOTE — NURSING NOTE
Rapid Response Nurse note:     RR called for low heart rate; EKG confirmed patient in Sinus bradycardia; heart rate 30-40's while asleep; normal sinus rhythm in the 60's. Per patient this low heart rate is not new. Patient hemodynamically stable and asymptomatic. Will continue to monitor at this time.

## 2024-08-24 NOTE — ED PROVIDER NOTES
HPI was provided by patient, EMS    HPI:    Chief Complaint   Patient presents with    Seizures        Shamir Buenrostro is a 69 y.o. male presents with chief complaint of seizure-like activity.  Patient had multiple seizures some lasting 15 minutes some lasting a few minutes by EMS and at home.  Has history of brain cancer.  Arrives here in a postictal state.  Limiting the examination was given midazolam so may be some contribution of sedation limiting my examination.  Seizure activity as he loses conscious and general tonic-clonic.      ROS: Review of systems limited secondary to patient's acuity of condition       Physical Exam:  GENERAL: Alert, sedated, postictal state  HEAD: normocephalic, atraumatic  SKIN:  dry skin, no lesions.  EYES: PERRL,   ENT: No external deformities. Nares patent, mucus membranes moist.  Pharynx clear, uvula midline.   NECK: Supple, without meningismus. Trachea at midline. No lymphadenopathy.  PULMONARY: Clear bilaterally. No crackles, rhonchi, wheezing.  No respiratory distress.  No work of breathing.  CARDIAC: Regular rate and regular rhythm.  Pulses 2+ in radials and dorsal pedal pulses bilaterally.  No murmur, rub, gallop.  No edema.  ABDOMEN: Soft, nontender, active bowel sounds.  No palpable organomegaly.  No rebound or guarding.  No CVA tenderness.  No pulsatile masses.   MUSCULOSKELETAL:  Moves all 4 extremities no restriction no deformity no swelling  NEUROLOGICAL: Initially upon arrival here actively seizing.  Now moves all 4 extremities is speaking but hard to understand full sentences follows commands.  GCS 14   PSYCHIATRIC: Appropriate mood and affect. Calm.       MDM/ED COURSE:    The patient presented for evaluation of seizure-like activity.  Differential includes not limited to breakthrough seizure, anemia, electrolyte abnormality bleed mass.  Imaging studies if performed were independently reviewed and interpreted by myself and confirmed by radiologist.            I  discussed the differential, results and plan with the patient and/or family/friend/caregiver if present. All questions answered.      Note: This note was dictated by speech recognition. Minor errors in transcription may be present.          ED Course as of 08/24/24 0040   Fri Aug 23, 2024   2153 Was called in the room.  Patient having another generalized seizure.  Was given to Ativan and seizure had stopped. [WL]   2156 EKG interpreted by me shows junctional rhythm.  No STEMI.  Rate 63 QRS 88 QTc 450.  No prior EKG for comparison [WL]   2205 Family here at bedside states that he has not been taking any of his medications and is in town from [place].  He is not on any chemo and refusing any treatment for his brain cancer.  He is in town here visiting family.  It shows he supposed to be on lamotrigine. [WL]   2217 I was called to room.  Patient had another seizure only lasting a few seconds and when I entered the room he was alert oriented talking no need for any further medicating at this time  [WL]   2233 Coronavirus 2019, PCR(!): Detected [WL]   2243 Patient eval at bedside.  Now alert sitting up in no acute distress [WL]   2346 Ct shows No acute intracranial abnormality. Cerebral atrophy noted. Images are  degraded by patient motion artifact.   [WL]   2346 1. No acute bony abnormalities  2. Images are degraded by patient motion artifact.  3. Cervical spondylosis noted. Previous anterior cervical spine  fusion appears unremarkable.   [WL]   Sat Aug 24, 2024   0024 Discussed case with neurology at [facility] Dr. Epperson he would like us to enter his meds in the system and accepts patient as transfer advised we can have patient continue on 500 twice daily of the Keppra. [WL]   0031 Patient very restless here.  He is on Seroquel we will give him his dose. [WL]      ED Course User Index  [WL] Yazan Keith DO         Diagnoses as of 08/24/24 0040   Status epilepticus (Multi)   COVID-19         History reviewed. No  pertinent past medical history.   Social History     Socioeconomic History    Marital status: Unknown     No current outpatient medications  No Known Allergies          ED Triage Vitals   Temp Pulse Resp BP   -- -- -- --      SpO2 Temp src Heart Rate Source Patient Position   -- -- -- --      BP Location FiO2 (%)     -- --               Labs and Imaging  CT head wo IV contrast   Final Result   No acute intracranial abnormality. Cerebral atrophy noted. Images are   degraded by patient motion artifact.        MACRO:   None.        Signed by: Agnes Bobo 8/23/2024 11:22 PM   Dictation workstation:   [id number]      CT cervical spine wo IV contrast   Final Result        1. No acute bony abnormalities   2. Images are degraded by patient motion artifact.   3. Cervical spondylosis noted. Previous anterior cervical spine   fusion appears unremarkable.        MACRO:   None        Signed by: Agnes Bobo 8/23/2024 11:26 PM   Dictation workstation:   [id number]        Labs Reviewed   SARS-COV-2 PCR - Abnormal       Result Value    Coronavirus 2019, PCR Detected (*)     Narrative:     This assay has received FDA Emergency Use Authorization (EUA) and is only authorized for the duration of time that circumstances exist to justify the authorization of the emergency use of in vitro diagnostic tests for the detection of SARS-CoV-2 virus and/or diagnosis of COVID-19 infection under section 564(b)(1) of the Act, 21 U.S.C. 360bbb-3(b)(1). This assay is an in vitro diagnostic nucleic acid amplification test for the qualitative detection of SARS-CoV-2 from nasopharyngeal specimens and has been validated for use at [facility]. Negative results do not preclude COVID-19 infections and should not be used as the sole basis for diagnosis, treatment, or other management decisions.     URINALYSIS WITH REFLEX CULTURE AND MICROSCOPIC - Abnormal    Color, Urine Yellow      Appearance, Urine Clear      Specific Gravity,  Urine 1.021      pH, Urine 5.0      Protein, Urine NEGATIVE      Glucose, Urine Normal      Blood, Urine NEGATIVE      Ketones, Urine NEGATIVE      Bilirubin, Urine NEGATIVE      Urobilinogen, Urine 2 (1+) (*)     Nitrite, Urine NEGATIVE      Leukocyte Esterase, Urine 75 Edward/µL (*)    MICROSCOPIC ONLY, URINE - Abnormal    WBC, Urine 1-5      RBC, Urine NONE      Squamous Epithelial Cells, Urine 1-9 (SPARSE)      Bacteria, Urine 1+ (*)     Mucus, Urine FEW      Hyaline Casts, Urine 1+ (*)    BASIC METABOLIC PANEL - Normal    Glucose 87      Sodium 140      Potassium 4.1      Chloride 107      Bicarbonate 25      Anion Gap 12      Urea Nitrogen 13      Creatinine 1.12      eGFR 71      Calcium 9.4     LACTATE - Normal    Lactate 1.4      Narrative:     Venipuncture immediately after or during the administration of Metamizole may lead to falsely low results. Testing should be performed immediately  prior to Metamizole dosing.   ALCOHOL - Normal    Alcohol <10     DRUG SCREEN,URINE - Normal    Amphetamine Screen, Urine Presumptive Negative      Barbiturate Screen, Urine Presumptive Negative      Benzodiazepines Screen, Urine Presumptive Negative      Cannabinoid Screen, Urine Presumptive Negative      Cocaine Metabolite Screen, Urine Presumptive Negative      Fentanyl Screen, Urine Presumptive Negative      Opiate Screen, Urine Presumptive Negative      Oxycodone Screen, Urine Presumptive Negative      PCP Screen, Urine Presumptive Negative      Methadone Screen, Urine Presumptive Negative      Narrative:     Drug screen results are presumptive and should not be used to assess   compliance with prescribed medication. Contact the performing [facility] laboratory   to add-on definitive confirmatory testing if clinically indicated.    Toxicology screening results are reported qualitatively. The concentration must   be greater than or equal to the cutoff to be reported as positive. The concentration   at which the screening test  can detect an individual drug or metabolite varies.   The absence of expected drug(s) and/or drug metabolite(s) may indicate non-compliance,   inappropriate timing of specimen collection relative to drug administration, poor drug   absorption, diluted/adulterated urine, or limitations of testing. For medical purposes   only; not valid for forensic use.    Interpretive questions should be directed to the laboratory medical directors.   SERIAL TROPONIN-INITIAL - Normal    Troponin I, High Sensitivity 5      Narrative:     Less than 99th percentile of normal range cutoff-  Female and children under 18 years old <14 ng/L; Male <21 ng/L: Negative  Repeat testing should be performed if clinically indicated.     Female and children under 18 years old 14-50 ng/L; Male 21-50 ng/L:  Consistent with possible cardiac damage and possible increased clinical   risk. Serial measurements may help to assess extent of myocardial damage.     >50 ng/L: Consistent with cardiac damage, increased clinical risk and  myocardial infarction. Serial measurements may help assess extent of   myocardial damage.      NOTE: Children less than 1 year old may have higher baseline troponin   levels and results should be interpreted in conjunction with the overall   clinical context.     NOTE: Troponin I testing is performed using a different   testing methodology at [facility] than at other   [facility]. Direct result comparisons should only   be made within the same method.   URINE CULTURE   CBC WITH AUTO DIFFERENTIAL    WBC 6.8      nRBC 0.0      RBC 4.77      Hemoglobin 13.8      Hematocrit 42.3      MCV 89      MCH 28.9      MCHC 32.6      RDW 13.2      Platelets 238      Neutrophils % 47.6      Immature Granulocytes %, Automated 0.3      Lymphocytes % 37.7      Monocytes % 9.7      Eosinophils % 4.0      Basophils % 0.7      Neutrophils Absolute 3.24      Immature Granulocytes Absolute, Automated 0.02      Lymphocytes Absolute  2.57      Monocytes Absolute 0.66      Eosinophils Absolute 0.27      Basophils Absolute 0.05     TROPONIN SERIES- (INITIAL, 1 HR)    Narrative:     The following orders were created for panel order Troponin I Series, High Sensitivity (0, 1 HR).  Procedure                               Abnormality         Status                     ---------                               -----------         ------                     Troponin I, High Sensiti...[[id number]]  Normal              Final result               Troponin, High Sensitivi...[[id number]]                                                   Please view results for these tests on the individual orders.   SERIAL TROPONIN, 1 HOUR   URINALYSIS WITH REFLEX CULTURE AND MICROSCOPIC    Narrative:     The following orders were created for panel order Urinalysis with Reflex Culture and Microscopic.  Procedure                               Abnormality         Status                     ---------                               -----------         ------                     Urinalysis with Reflex C...[[id number]]  Abnormal            Final result               Extra Urine Gray Tube[[id number]]                            In process                   Please view results for these tests on the individual orders.   EXTRA URINE GRAY TUBE           Procedure  Critical Care    Performed by: Yazan Keith DO  Authorized by: Yazan Keith DO    Critical care provider statement:     Critical care time (minutes):  31    Critical care time was exclusive of:  Separately billable procedures and treating other patients    Critical care was necessary to treat or prevent imminent or life-threatening deterioration of the following conditions: Status epilepticus.    Critical care was time spent personally by me on the following activities:  Ordering and performing treatments and interventions, ordering and review of laboratory studies, ordering and review of radiographic studies, pulse oximetry,  re-evaluation of patient's condition, review of old charts, examination of patient, evaluation of patient's response to treatment, development of treatment plan with patient or surrogate, discussions with consultants and obtaining history from patient or surrogate    Care discussed with: accepting provider at another facility                      Yazan Keith DO  08/24/24 0047

## 2024-08-25 VITALS
TEMPERATURE: 98.4 F | DIASTOLIC BLOOD PRESSURE: 94 MMHG | BODY MASS INDEX: 43.76 KG/M2 | SYSTOLIC BLOOD PRESSURE: 157 MMHG | OXYGEN SATURATION: 96 % | WEIGHT: 296.3 LBS | RESPIRATION RATE: 18 BRPM | HEART RATE: 56 BPM

## 2024-08-25 LAB — BACTERIA UR CULT: NORMAL

## 2024-08-25 PROCEDURE — 2500000001 HC RX 250 WO HCPCS SELF ADMINISTERED DRUGS (ALT 637 FOR MEDICARE OP): Performed by: PHARMACIST

## 2024-08-25 PROCEDURE — 94640 AIRWAY INHALATION TREATMENT: CPT

## 2024-08-25 PROCEDURE — 1200000002 HC GENERAL ROOM WITH TELEMETRY DAILY

## 2024-08-25 PROCEDURE — 2500000004 HC RX 250 GENERAL PHARMACY W/ HCPCS (ALT 636 FOR OP/ED): Performed by: INTERNAL MEDICINE

## 2024-08-25 PROCEDURE — 99222 1ST HOSP IP/OBS MODERATE 55: CPT | Performed by: PSYCHIATRY & NEUROLOGY

## 2024-08-25 PROCEDURE — 2500000002 HC RX 250 W HCPCS SELF ADMINISTERED DRUGS (ALT 637 FOR MEDICARE OP, ALT 636 FOR OP/ED): Performed by: INTERNAL MEDICINE

## 2024-08-25 PROCEDURE — 99233 SBSQ HOSP IP/OBS HIGH 50: CPT | Performed by: INTERNAL MEDICINE

## 2024-08-25 PROCEDURE — 2500000001 HC RX 250 WO HCPCS SELF ADMINISTERED DRUGS (ALT 637 FOR MEDICARE OP): Performed by: HOSPITALIST

## 2024-08-25 PROCEDURE — 76937 US GUIDE VASCULAR ACCESS: CPT

## 2024-08-25 PROCEDURE — 2500000004 HC RX 250 GENERAL PHARMACY W/ HCPCS (ALT 636 FOR OP/ED)

## 2024-08-25 PROCEDURE — 2500000001 HC RX 250 WO HCPCS SELF ADMINISTERED DRUGS (ALT 637 FOR MEDICARE OP): Performed by: INTERNAL MEDICINE

## 2024-08-25 RX ORDER — SERTRALINE HYDROCHLORIDE 50 MG/1
150 TABLET, FILM COATED ORAL NIGHTLY
Status: DISCONTINUED | OUTPATIENT
Start: 2024-08-25 | End: 2024-09-02 | Stop reason: HOSPADM

## 2024-08-25 RX ORDER — LORAZEPAM 2 MG/ML
INJECTION INTRAMUSCULAR
Status: COMPLETED
Start: 2024-08-25 | End: 2024-08-25

## 2024-08-25 RX ORDER — FLUTICASONE FUROATE AND VILANTEROL 200; 25 UG/1; UG/1
1 POWDER RESPIRATORY (INHALATION)
Status: DISCONTINUED | OUTPATIENT
Start: 2024-08-25 | End: 2024-09-02 | Stop reason: HOSPADM

## 2024-08-25 RX ORDER — VENLAFAXINE HYDROCHLORIDE 75 MG/1
150 CAPSULE, EXTENDED RELEASE ORAL DAILY
Status: DISCONTINUED | OUTPATIENT
Start: 2024-08-25 | End: 2024-09-02 | Stop reason: HOSPADM

## 2024-08-25 RX ORDER — LORAZEPAM 2 MG/ML
2 INJECTION INTRAMUSCULAR EVERY 4 HOURS PRN
Status: DISCONTINUED | OUTPATIENT
Start: 2024-08-25 | End: 2024-08-25

## 2024-08-25 RX ORDER — CARVEDILOL 3.12 MG/1
3.12 TABLET ORAL 2 TIMES DAILY
Status: DISCONTINUED | OUTPATIENT
Start: 2024-08-25 | End: 2024-09-02 | Stop reason: HOSPADM

## 2024-08-25 RX ORDER — ASPIRIN 81 MG/1
81 TABLET ORAL DAILY
Status: DISCONTINUED | OUTPATIENT
Start: 2024-08-25 | End: 2024-09-02 | Stop reason: HOSPADM

## 2024-08-25 RX ORDER — DONEPEZIL HYDROCHLORIDE 5 MG/1
10 TABLET, FILM COATED ORAL NIGHTLY
Status: DISCONTINUED | OUTPATIENT
Start: 2024-08-25 | End: 2024-09-02 | Stop reason: HOSPADM

## 2024-08-25 RX ORDER — DULOXETIN HYDROCHLORIDE 30 MG/1
30 CAPSULE, DELAYED RELEASE ORAL DAILY
Status: DISCONTINUED | OUTPATIENT
Start: 2024-08-25 | End: 2024-09-02 | Stop reason: HOSPADM

## 2024-08-25 RX ORDER — LEVETIRACETAM 5 MG/ML
500 INJECTION INTRAVASCULAR ONCE
Status: COMPLETED | OUTPATIENT
Start: 2024-08-25 | End: 2024-08-25

## 2024-08-25 RX ORDER — MIRTAZAPINE 15 MG/1
7.5 TABLET, FILM COATED ORAL NIGHTLY
Status: DISCONTINUED | OUTPATIENT
Start: 2024-08-25 | End: 2024-09-02 | Stop reason: HOSPADM

## 2024-08-25 RX ORDER — ZONISAMIDE 100 MG/1
100 CAPSULE ORAL DAILY
Status: DISCONTINUED | OUTPATIENT
Start: 2024-08-25 | End: 2024-09-02 | Stop reason: HOSPADM

## 2024-08-25 RX ORDER — ATORVASTATIN CALCIUM 40 MG/1
40 TABLET, FILM COATED ORAL DAILY
Status: DISCONTINUED | OUTPATIENT
Start: 2024-08-25 | End: 2024-09-02 | Stop reason: HOSPADM

## 2024-08-25 RX ORDER — QUETIAPINE FUMARATE 25 MG/1
25 TABLET, FILM COATED ORAL NIGHTLY
Status: DISCONTINUED | OUTPATIENT
Start: 2024-08-25 | End: 2024-09-02 | Stop reason: HOSPADM

## 2024-08-25 RX ORDER — LORAZEPAM 2 MG/ML
2 INJECTION INTRAMUSCULAR ONCE
Status: COMPLETED | OUTPATIENT
Start: 2024-08-25 | End: 2024-08-25

## 2024-08-25 RX ORDER — PANTOPRAZOLE SODIUM 40 MG/1
40 TABLET, DELAYED RELEASE ORAL
Status: DISCONTINUED | OUTPATIENT
Start: 2024-08-25 | End: 2024-09-02 | Stop reason: HOSPADM

## 2024-08-25 RX ORDER — LEVETIRACETAM 500 MG/1
1000 TABLET ORAL 2 TIMES DAILY
Status: DISCONTINUED | OUTPATIENT
Start: 2024-08-25 | End: 2024-08-25 | Stop reason: ALTCHOICE

## 2024-08-25 RX ORDER — FLUTICASONE FUROATE AND VILANTEROL 200; 25 UG/1; UG/1
1 POWDER RESPIRATORY (INHALATION)
Status: DISCONTINUED | OUTPATIENT
Start: 2024-08-25 | End: 2024-08-25 | Stop reason: CLARIF

## 2024-08-25 RX ORDER — FORMOTEROL FUMARATE DIHYDRATE 20 UG/2ML
20 SOLUTION RESPIRATORY (INHALATION)
Status: DISCONTINUED | OUTPATIENT
Start: 2024-08-25 | End: 2024-08-25 | Stop reason: ALTCHOICE

## 2024-08-25 RX ORDER — BUDESONIDE 0.5 MG/2ML
0.5 INHALANT ORAL
Status: DISCONTINUED | OUTPATIENT
Start: 2024-08-25 | End: 2024-08-25 | Stop reason: ALTCHOICE

## 2024-08-25 RX ORDER — LORAZEPAM 2 MG/ML
2 INJECTION INTRAMUSCULAR EVERY 4 HOURS PRN
Status: DISCONTINUED | OUTPATIENT
Start: 2024-08-25 | End: 2024-09-02 | Stop reason: HOSPADM

## 2024-08-25 RX ORDER — LEVETIRACETAM 10 MG/ML
1000 INJECTION INTRAVASCULAR EVERY 12 HOURS
Status: DISCONTINUED | OUTPATIENT
Start: 2024-08-25 | End: 2024-08-28

## 2024-08-25 RX ADMIN — LORAZEPAM 2 MG: 2 INJECTION INTRAMUSCULAR; INTRAVENOUS at 18:13

## 2024-08-25 RX ADMIN — ZONISAMIDE 100 MG: 100 CAPSULE ORAL at 18:39

## 2024-08-25 RX ADMIN — LEVETIRACETAM 1000 MG: 10 INJECTION INTRAVENOUS at 18:30

## 2024-08-25 RX ADMIN — VENLAFAXINE HYDROCHLORIDE 150 MG: 75 CAPSULE, EXTENDED RELEASE ORAL at 18:39

## 2024-08-25 RX ADMIN — LEVETIRACETAM 500 MG: 5 INJECTION INTRAVENOUS at 01:24

## 2024-08-25 RX ADMIN — FLUTICASONE FUROATE AND VILANTEROL TRIFENATATE 1 PUFF: 200; 25 POWDER RESPIRATORY (INHALATION) at 08:30

## 2024-08-25 RX ADMIN — ROPINIROLE HYDROCHLORIDE 8 MG: 1 TABLET, FILM COATED ORAL at 20:48

## 2024-08-25 RX ADMIN — QUETIAPINE FUMARATE 25 MG: 25 TABLET ORAL at 20:47

## 2024-08-25 RX ADMIN — LORAZEPAM 2 MG: 2 INJECTION INTRAMUSCULAR; INTRAVENOUS at 18:25

## 2024-08-25 RX ADMIN — LORAZEPAM 2 MG: 2 INJECTION INTRAMUSCULAR at 01:10

## 2024-08-25 RX ADMIN — RIVAROXABAN 20 MG: 20 TABLET, FILM COATED ORAL at 17:04

## 2024-08-25 RX ADMIN — DULOXETINE HYDROCHLORIDE 30 MG: 30 CAPSULE, DELAYED RELEASE ORAL at 18:39

## 2024-08-25 RX ADMIN — MIRTAZAPINE 7.5 MG: 15 TABLET, FILM COATED ORAL at 20:48

## 2024-08-25 RX ADMIN — DONEPEZIL HYDROCHLORIDE 10 MG: 5 TABLET ORAL at 20:47

## 2024-08-25 RX ADMIN — LAMOTRIGINE 150 MG: 100 TABLET ORAL at 18:38

## 2024-08-25 RX ADMIN — ATORVASTATIN CALCIUM 40 MG: 40 TABLET, FILM COATED ORAL at 18:38

## 2024-08-25 RX ADMIN — SERTRALINE 150 MG: 50 TABLET, FILM COATED ORAL at 20:45

## 2024-08-25 RX ADMIN — ASPIRIN 81 MG: 81 TABLET, COATED ORAL at 18:38

## 2024-08-25 RX ADMIN — LORAZEPAM 2 MG: 2 INJECTION INTRAMUSCULAR; INTRAVENOUS at 01:10

## 2024-08-25 ASSESSMENT — COGNITIVE AND FUNCTIONAL STATUS - GENERAL
TURNING FROM BACK TO SIDE WHILE IN FLAT BAD: A LITTLE
CLIMB 3 TO 5 STEPS WITH RAILING: TOTAL
STANDING UP FROM CHAIR USING ARMS: A LOT
PERSONAL GROOMING: A LITTLE
HELP NEEDED FOR BATHING: A LITTLE
MOBILITY SCORE: 15
TOILETING: A LITTLE
MOBILITY SCORE: 15
STANDING UP FROM CHAIR USING ARMS: A LOT
MOVING TO AND FROM BED TO CHAIR: A LITTLE
TOILETING: A LITTLE
DRESSING REGULAR LOWER BODY CLOTHING: A LITTLE
TURNING FROM BACK TO SIDE WHILE IN FLAT BAD: A LITTLE
HELP NEEDED FOR BATHING: A LITTLE
CLIMB 3 TO 5 STEPS WITH RAILING: TOTAL
MOVING TO AND FROM BED TO CHAIR: A LITTLE
DAILY ACTIVITIY SCORE: 19
DAILY ACTIVITIY SCORE: 19
DRESSING REGULAR LOWER BODY CLOTHING: A LITTLE
DRESSING REGULAR UPPER BODY CLOTHING: A LITTLE
WALKING IN HOSPITAL ROOM: A LOT
PERSONAL GROOMING: A LITTLE
WALKING IN HOSPITAL ROOM: A LOT
DRESSING REGULAR UPPER BODY CLOTHING: A LITTLE

## 2024-08-25 ASSESSMENT — PAIN SCALES - GENERAL
PAINLEVEL_OUTOF10: 5 - MODERATE PAIN
PAINLEVEL_OUTOF10: 0 - NO PAIN

## 2024-08-25 ASSESSMENT — PAIN DESCRIPTION - LOCATION: LOCATION: GENERALIZED

## 2024-08-25 NOTE — CONSULTS
"Consults    History Of Present Illness  Mr. Buenrostro is a 69 y.o. man who presented to [facility] for evaluation of seizures at home. Patient had multiple seizures some lasting 15 minutes some lasting a few minutes by EMS and at home. He was coming from [place] to visit his brother in [place].   Patient reports not taking any AEM, he also reports history of brain tumor but lost follow-up and he is not on any medications for the last few years.     I spoke with patient's wife who is a poor historian and has limited insight into his medical history. Patient is from [place], [place]. \" Patient with pacemaker but he nor his wife know brand, when it was placed or why it was placed. His brother mentioned that they have financial difficulties and were unable to follow up for Mr. Buenrostro's medical conditions.   Patient apparently has history of brain cancer but is not on any chemo and refusing any treatment for his brain cancer.    Patient tested positive for COVID 19 in ER.  Past Medical History  Past Medical History:   Diagnosis Date    Brain tumor (Multi)      Surgical History  No past surgical history on file.  Social History     Allergies  Adhesive tape-silicones, Morphine, and Testosterone  Medications Prior to Admission   Medication Sig Dispense Refill Last Dose    amLODIPine (Norvasc) 2.5 mg tablet Take 1 tablet (2.5 mg) by mouth once daily.   Unknown    aspirin 81 mg EC tablet Take 1 tablet (81 mg) by mouth once daily.   Unknown    atorvastatin (Lipitor) 40 mg tablet Take 1 tablet (40 mg) by mouth once daily.   Unknown    carvedilol (Coreg) 3.125 mg tablet Take 1 tablet (3.125 mg) by mouth 2 times a day.   Unknown    donepezil (Aricept) 10 mg tablet Take 1 tablet (10 mg) by mouth once daily at bedtime.   Unknown    DULoxetine (Cymbalta) 30 mg DR capsule Take 1 capsule (30 mg) by mouth once daily.   Unknown    famotidine (Pepcid) 20 mg tablet Take 1 tablet (20 mg) by mouth 2 times a day.   Unknown    fluticasone propion-salmeteroL " (Advair Diskus) 250-50 mcg/dose diskus inhaler Inhale 1 puff 2 times a day. Rinse mouth with water after use to reduce aftertaste and incidence of candidiasis. Do not swallow.   Unknown    furosemide (Lasix) 20 mg tablet Take 1 tablet (20 mg) by mouth once daily.   Unknown    lamoTRIgine (LaMICtal) 150 mg tablet Take 1 tablet (150 mg) by mouth 2 times a day.   Unknown    mirtazapine (Remeron) 7.5 mg tablet Take 1 tablet (7.5 mg) by mouth once daily at bedtime.   Unknown    pantoprazole (ProtoNix) 40 mg EC tablet Take 1 tablet (40 mg) by mouth once daily in the morning. Take before meals.   Unknown    potassium chloride CR 10 mEq ER tablet Take 2 tablets (20 mEq) by mouth once daily. Do not crush, chew, or split.   Unknown    QUEtiapine (SEROquel) 25 mg tablet Take 1 tablet (25 mg) by mouth once daily at bedtime.   Unknown    rOPINIRole (Requip) 4 mg tablet Take 2 tablets (8 mg) by mouth once daily at bedtime.   Unknown    sertraline (Zoloft) 100 mg tablet Take 1.5 tablets (150 mg) by mouth once daily at bedtime.   Unknown    venlafaxine XR (Effexor XR) 150 mg 24 hr capsule Take 1 capsule (150 mg) by mouth once daily.   Unknown    Xarelto 20 mg tablet Take 1 tablet (20 mg) by mouth once daily in the evening. Take with meals.   Unknown    zonisamide (Zonegran) 100 mg capsule Take 1 capsule (100 mg) by mouth once daily.   Unknown       Review of Systems  Neurological Exam  Physical Exam  General: Nontoxic appearing in no acute distress  Psychiatry: Flattened affect. Mood low and, patient was tearful during exam, Damon suicidal thoughts. No psychomotor retardation/agitation.   Neurological Exam: Awake, alert. Attention intact. Speech fluent without dysphasia. Gaze conjugate, left sided facial weakness(present for years). No adventitious movements appreciated.  Patient adequately follows commands, no motor weakness except left foot dorsiflexion 4/5, FNF intact with no dysmetria, gait was deferred due to patient's safety.    Last Recorded Vitals  Blood pressure 108/71, pulse (!) 49, temperature 36.2 °C (97.1 °F), temperature source Temporal, resp. rate 16, SpO2 100%.    Relevant Results  Results for orders placed or performed during the hospital encounter of 08/24/24 (from the past 96 hour(s))   CBC and Auto Differential   Result Value Ref Range    WBC 4.9 4.4 - 11.3 x10*3/uL    nRBC 0.0 0.0 - 0.0 /100 WBCs    RBC 4.78 4.50 - 5.90 x10*6/uL    Hemoglobin 14.0 13.5 - 17.5 g/dL    Hematocrit 44.1 41.0 - 52.0 %    MCV 92 80 - 100 fL    MCH 29.3 26.0 - 34.0 pg    MCHC 31.7 (L) 32.0 - 36.0 g/dL    RDW 13.2 11.5 - 14.5 %    Platelets 174 150 - 450 x10*3/uL    Neutrophils % 44.9 40.0 - 80.0 %    Immature Granulocytes %, Automated 0.0 0.0 - 0.9 %    Lymphocytes % 37.0 13.0 - 44.0 %    Monocytes % 12.6 2.0 - 10.0 %    Eosinophils % 4.5 0.0 - 6.0 %    Basophils % 1.0 0.0 - 2.0 %    Neutrophils Absolute 2.22 1.20 - 7.70 x10*3/uL    Immature Granulocytes Absolute, Automated 0.00 0.00 - 0.70 x10*3/uL    Lymphocytes Absolute 1.83 1.20 - 4.80 x10*3/uL    Monocytes Absolute 0.62 0.10 - 1.00 x10*3/uL    Eosinophils Absolute 0.22 0.00 - 0.70 x10*3/uL    Basophils Absolute 0.05 0.00 - 0.10 x10*3/uL   Protime-INR   Result Value Ref Range    Protime 11.5 9.8 - 12.8 seconds    INR 1.0 0.9 - 1.1   Blood Gas Venous Full Panel   Result Value Ref Range    POCT pH, Venous 7.31 (L) 7.33 - 7.43 pH    POCT pCO2, Venous 56 (H) 41 - 51 mm Hg    POCT pO2, Venous 31 (L) 35 - 45 mm Hg    POCT SO2, Venous 45 45 - 75 %    POCT Oxy Hemoglobin, Venous 44.2 (L) 45.0 - 75.0 %    POCT Hematocrit Calculated, Venous 42.0 41.0 - 52.0 %    POCT Sodium, Venous 141 136 - 145 mmol/L    POCT Potassium, Venous 3.9 3.5 - 5.3 mmol/L    POCT Chloride, Venous 110 (H) 98 - 107 mmol/L    POCT Ionized Calicum, Venous 1.15 1.10 - 1.33 mmol/L    POCT Glucose, Venous 74 74 - 99 mg/dL    POCT Lactate, Venous 2.5 (H) 0.4 - 2.0 mmol/L    POCT Base Excess, Venous 0.7 -2.0 - 3.0 mmol/L    POCT HCO3  Calculated, Venous 28.2 (H) 22.0 - 26.0 mmol/L    POCT Hemoglobin, Venous 14.1 13.5 - 17.5 g/dL    POCT Anion Gap, Venous 7.0 (L) 10.0 - 25.0 mmol/L    Patient Temperature 37.0 degrees Celsius    FiO2 21 %    Scheduled medications  enoxaparin, 40 mg, subcutaneous, q12h PAYAL  levETIRAcetam, 500 mg, intravenous, q12h  [START ON 8/25/2024] remdesivir, 100 mg, intravenous, q24h  rOPINIRole, 8 mg, oral, Nightly      Continuous medications     PRN medications  PRN medications: acetaminophen **OR** acetaminophen **OR** acetaminophen     I have personally reviewed the following imaging results XR chest 1 view    Result Date: 8/24/2024  Interpreted By:  Agnes Bobo, STUDY: XR CHEST 1 VIEW 8/24/2024 11:39 am   INDICATION: Signs/Symptoms:covid   COMPARISON: None   ACCESSION NUMBER(S): [id number]   ORDERING CLINICIAN: KANCHAN SALINAS   TECHNIQUE: AP view   FINDINGS: There are low lung volumes with enlargement of the cardiac silhouette. A loop recorder overlies the left lower chest. Cervical spine fusion hardware is noted. Pulmonary vascularity is normal. There is mild right lower lobe atelectasis or infiltrate medially.       Low lung volumes and slight atelectasis or infiltrate in the medial right lung base   Signed by: Agnes Bobo 8/24/2024 12:02 PM Dictation workstation:   [id number]    CT cervical spine wo IV contrast    Result Date: 8/23/2024  Interpreted By:  Agnes Bobo, STUDY: CT CERVICAL SPINE WO IV CONTRAST;  8/23/2024 10:38 pm   INDICATION: Signs/Symptoms:ams.   COMPARISON: None.   ACCESSION NUMBER(S): [id number]   ORDERING CLINICIAN: MIN CORTÉS   TECHNIQUE: Axial CT images of the cervical spine are obtained. Axial, coronal and sagittal reconstructions are provided for review. Severe patient motion artifact degrades images.   All CT exams are performed with 1 or more of the following dose reduction techniques: Automatic exposure control, adjustment of mA and/or kv according to patient size, or use of iterative  reconstruction techniques.   FINDINGS: Patient motion artifact degrades images. There is an anterior plate with screws at C5, C6, and C7. No compression fracture or subluxation. No prevertebral soft tissue swelling. Marked disc space narrowing at C4-5 with osteophytic ridging anteriorly and posteriorly. Disc space narrowing is noted at C2-3 and C3-4 as well. Facet hypertrophy is seen at several levels.   No bony canal stenosis or bony foraminal stenosis.         1. No acute bony abnormalities 2. Images are degraded by patient motion artifact. 3. Cervical spondylosis noted. Previous anterior cervical spine fusion appears unremarkable.   MACRO: None   Signed by: Agnes Bobo 8/23/2024 11:26 PM Dictation workstation:   [id number]    CT head wo IV contrast    Result Date: 8/23/2024  Interpreted By:  Agnes Bobo, STUDY: CT HEAD WO IV CONTRAST;  8/23/2024 10:38 pm   INDICATION: Signs/Symptoms:seizure.   COMPARISON: None..   ACCESSION NUMBER(S): [id number]   ORDERING CLINICIAN: MIN CORTÉS   TECHNIQUE: CT axial images through the Brain were obtained without contrast. Images are degraded by patient motion artifact.   All CT exams are performed with 1 or more of the following dose reduction techniques: Automatic exposure control, adjustment of mA and/or kv according to patient size, or use of iterative reconstruction techniques.   FINDINGS: There is no mass effect, hemorrhage, or infarct. The ventricles appear normal. Gray-white differentiation is maintained. There is cerebral atrophy.   The visualized paranasal sinuses appear clear. The visualized portions of the orbits are unremarkable.       No acute intracranial abnormality. Cerebral atrophy noted. Images are degraded by patient motion artifact.   MACRO: None.   Signed by: Agnes Bobo 8/23/2024 11:22 PM Dictation workstation:   [id number]  .    Assessment/Plan   Assessment & Plan  Seizure (Multi)  Mr. Buenrostro is a 69 y.o. man who presented to [facility] for  "evaluation of seizures at home. Patient had multiple seizures some lasting 15 minutes some lasting a few minutes by EMS and at home, he had another generalized seizure for seconds in ED and was given ativan and 1g IV Keppra and seizure stopped. He was coming from [place] to visit his brother in [place]. Patient reports not taking any AEM, he also reports history of brain tumor but lost follow-up and he is not on any medications for the last few years.  I spoke with patient's wife who is a poor historian and has limited insight into his medical history. Patient is from [place], [place]. \" Patient with pacemaker but he nor his wife know brand, when it was placed or why it was placed. His brother mentioned that they have financial difficulties and were unable to follow up for Mr. Buenrostro's medical conditions. Patient apparently has history of brain cancer but is not on any chemo and refusing any treatment for his brain cancer.   Patient tested positive for COVID 19 in ER.  Neurological exam shows awake, alert, oriented to time (month and year), place ([place]), person and situation. Attention intact. Speech fluent without dysphasia. Gaze conjugate, left sided facial weakness (old per patient),  No adventitious movements appreciated.  Patient adequately follows commands, no motor weakness except 4/5 left foot dorsiflexion., FNF intact with no dysmetria. CTH was competed at [facility] with no acute intracranial abnormalities, CT C spine with no acute bony abnormalities, images are motion degraded. I counseled the patient and his family about the importance of medication adherence including AEM.     Recommendations:  Continue Keppra 500 mg BID  MRI brain w/wo contrast  cvEEG  Family and patient were informed that Mr. Buenrostro should STOP driving  I recommend consulting social work to help arrangements for care  Please contact his providers in [place] for further history and medical records  Treatment of infectious/metabolic " derangements per primary team.  Seizures precautions.  Neurology will continue to follow    I spent 67 minutes in the professional and overall care of this patient.      Mahin Quevedo MD

## 2024-08-25 NOTE — ASSESSMENT & PLAN NOTE
"Mr. Buenrostro is a 69 y.o. man who is transferred to [facility] from [facility] for seizures management, initially presented to [facility] for evaluation of seizures at home. Patient had multiple seizures some lasting 15 minutes some lasting a few minutes by EMS and at home, he had another generalized seizure for seconds in ED and was given ativan and 1g IV Keppra and seizure stopped. He was coming from [place] to visit his brother in [place]. Patient reports not taking any AEM, he also reports history of brain tumor but lost follow-up and he is not on any medications for the last few years.  I spoke with patient's wife who is a poor historian and has limited insight into his medical history. Patient is from [place], [place]. \" Patient with pacemaker but he nor his wife know brand, when it was placed or why it was placed. His brother mentioned that they have financial difficulties and were unable to follow up for Mr. Buenrostro's medical conditions. Patient apparently has history of brain cancer but is not on any chemo and refusing any treatment for his brain cancer.   Patient tested positive for COVID 19 in ER.  Neurological exam shows awake, alert, oriented to time (month and year), place ([place]), person and situation. Attention intact. Speech fluent without dysphasia. Gaze conjugate, left sided facial weakness (old per patient), No adventitious movements appreciated.  Patient adequately follows commands, no motor weakness except 4/5 left foot dorsiflexion. FNF intact with no dysmetria. CTH was competed at [facility] with no acute intracranial abnormalities, CT C-spine with no acute bony abnormalities, images are motion degraded. Per nurse, patient has been refusing taking meds in the hospital, developed seizure, Ativan and Keppra were given and seizure stopped, he refuses placing another IV line. I counseled the patient and his family about the importance of medication adherence including AEM, risk of uncontrolled seizures, " stopping driving. Patient agreed to take meds after talking with his wife who is supposed to visit him later today.    Recommendations:  Continue Keppra 1 g BID  Continue home meds including AEM  MRI brain w/wo contrast  cvEEG  I recommend consulting psychiatry for management of MDD and capacity evaluation  Family and patient were informed that Mr. Buenrostro should STOP driving  I recommend consulting social work to help arrangements for care  Please contact his providers in [place] for further history and medical records  Treatment of infectious/metabolic derangements per primary team.  Seizures precautions.  Neurology will continue to follow

## 2024-08-25 NOTE — SIGNIFICANT EVENT
RR called for seizure. His right hand was twitching and maybe his legs and he was staring off. He was given 2mg ativan IM with resolution. He then had another similar seizure immediately after for which IV ativan was given after iv line obtained. It also resolved and now iv keppra is infusion. He agreed to take his other oral AEDs for now.

## 2024-08-25 NOTE — PROGRESS NOTES
"Shamir Buenrostro is a 69 y.o. male on day 1 of admission presenting with Seizure (Multi).      Subjective   Mr. Buenrostro is a 69 y.o. man who presented to [facility] for evaluation of seizures at home. Patient had multiple seizures some lasting 15 minutes some lasting a few minutes by EMS and at home. He was coming from [place] to visit his brother in [place].   Patient reports not taking any AEM, home AEM include lamotrigine and zonisamide, he also reports history of brain tumor but lost follow-up and he is not on any medications for the last few years.  I spoke with patient's wife yesterday who is a poor historian and has limited insight into his medical history. Patient is from [place], [place].\" Patient with pacemaker but he nor his wife know brand, when it was placed or why it was placed. His brother mentioned that they have financial difficulties and were unable to follow up for Mr. Buenrostro's medical conditions, but records show refills for his medications, he said today “I don't believe in doctors”, tearful during exam, stated that one doctor told him he has seizures and another doctor told him that he doesn't have seizures and doesn't need medications. Patient apparently has history of brain tumor but is not on any treatment, no Hx of brain surgery and refusing any treatment for his brain tumor. Patient tested positive for COVID 19 in ER.    Objective     Last Recorded Vitals  Blood pressure (!) 156/94, pulse 62, temperature 36.3 °C (97.4 °F), temperature source Temporal, resp. rate 16, weight 134 kg (296 lb 4.8 oz), SpO2 99%.    Physical Exam  Neurological Exam  General: Nontoxic appearing in no acute distress  Psychiatry: Flattened affect. Mood low and, patient was tearful during exam, Damon suicidal thoughts. No psychomotor retardation/agitation.   Neurological Exam: Awake, alert. Attention intact. Speech fluent without dysphasia. Gaze conjugate, left sided facial weakness(present for years). Mild bradykinesia with RRM, " no tremor, normal tone. No adventitious movements appreciated.  Patient adequately follows commands, no motor weakness except left foot dorsiflexion 4/5, FNF intact with no dysmetria, gait was deferred due to patient's safety.   Relevant Results  Results for orders placed or performed during the hospital encounter of 08/24/24 (from the past 96 hour(s))   CBC and Auto Differential   Result Value Ref Range    WBC 4.9 4.4 - 11.3 x10*3/uL    nRBC 0.0 0.0 - 0.0 /100 WBCs    RBC 4.78 4.50 - 5.90 x10*6/uL    Hemoglobin 14.0 13.5 - 17.5 g/dL    Hematocrit 44.1 41.0 - 52.0 %    MCV 92 80 - 100 fL    MCH 29.3 26.0 - 34.0 pg    MCHC 31.7 (L) 32.0 - 36.0 g/dL    RDW 13.2 11.5 - 14.5 %    Platelets 174 150 - 450 x10*3/uL    Neutrophils % 44.9 40.0 - 80.0 %    Immature Granulocytes %, Automated 0.0 0.0 - 0.9 %    Lymphocytes % 37.0 13.0 - 44.0 %    Monocytes % 12.6 2.0 - 10.0 %    Eosinophils % 4.5 0.0 - 6.0 %    Basophils % 1.0 0.0 - 2.0 %    Neutrophils Absolute 2.22 1.20 - 7.70 x10*3/uL    Immature Granulocytes Absolute, Automated 0.00 0.00 - 0.70 x10*3/uL    Lymphocytes Absolute 1.83 1.20 - 4.80 x10*3/uL    Monocytes Absolute 0.62 0.10 - 1.00 x10*3/uL    Eosinophils Absolute 0.22 0.00 - 0.70 x10*3/uL    Basophils Absolute 0.05 0.00 - 0.10 x10*3/uL   Protime-INR   Result Value Ref Range    Protime 11.5 9.8 - 12.8 seconds    INR 1.0 0.9 - 1.1   Blood Gas Venous Full Panel   Result Value Ref Range    POCT pH, Venous 7.31 (L) 7.33 - 7.43 pH    POCT pCO2, Venous 56 (H) 41 - 51 mm Hg    POCT pO2, Venous 31 (L) 35 - 45 mm Hg    POCT SO2, Venous 45 45 - 75 %    POCT Oxy Hemoglobin, Venous 44.2 (L) 45.0 - 75.0 %    POCT Hematocrit Calculated, Venous 42.0 41.0 - 52.0 %    POCT Sodium, Venous 141 136 - 145 mmol/L    POCT Potassium, Venous 3.9 3.5 - 5.3 mmol/L    POCT Chloride, Venous 110 (H) 98 - 107 mmol/L    POCT Ionized Calicum, Venous 1.15 1.10 - 1.33 mmol/L    POCT Glucose, Venous 74 74 - 99 mg/dL    POCT Lactate, Venous 2.5 (H)  0.4 - 2.0 mmol/L    POCT Base Excess, Venous 0.7 -2.0 - 3.0 mmol/L    POCT HCO3 Calculated, Venous 28.2 (H) 22.0 - 26.0 mmol/L    POCT Hemoglobin, Venous 14.1 13.5 - 17.5 g/dL    POCT Anion Gap, Venous 7.0 (L) 10.0 - 25.0 mmol/L    Patient Temperature 37.0 degrees Celsius    FiO2 21 %      Scheduled medications  aspirin, 81 mg, oral, Daily  atorvastatin, 40 mg, oral, Daily  carvedilol, 3.125 mg, oral, BID  donepezil, 10 mg, oral, Nightly  DULoxetine, 30 mg, oral, Daily  fluticasone furoate-vilanteroL, 1 puff, inhalation, Daily  lamoTRIgine, 150 mg, oral, BID  levETIRAcetam, 1,000 mg, intravenous, q12h  mirtazapine, 7.5 mg, oral, Nightly  pantoprazole, 40 mg, oral, Daily before breakfast  QUEtiapine, 25 mg, oral, Nightly  remdesivir, 100 mg, intravenous, q24h  rivaroxaban, 20 mg, oral, Daily with evening meal  rOPINIRole, 8 mg, oral, Nightly  sertraline, 150 mg, oral, Nightly  venlafaxine XR, 150 mg, oral, Daily  zonisamide, 100 mg, oral, Daily      Continuous medications     PRN medications  PRN medications: acetaminophen **OR** acetaminophen **OR** acetaminophen, LORazepam       XR chest 1 view    Result Date: 8/24/2024  Interpreted By:  Agnes Bobo, STUDY: XR CHEST 1 VIEW 8/24/2024 11:39 am   INDICATION: Signs/Symptoms:covid   COMPARISON: None   ACCESSION NUMBER(S): [id number]   ORDERING CLINICIAN: KANCHAN SALINAS   TECHNIQUE: AP view   FINDINGS: There are low lung volumes with enlargement of the cardiac silhouette. A loop recorder overlies the left lower chest. Cervical spine fusion hardware is noted. Pulmonary vascularity is normal. There is mild right lower lobe atelectasis or infiltrate medially.       Low lung volumes and slight atelectasis or infiltrate in the medial right lung base   Signed by: Agnes Bobo 8/24/2024 12:02 PM Dictation workstation:   [id number]    CT cervical spine wo IV contrast    Result Date: 8/23/2024  Interpreted By:  Agnes Bobo, STUDY: CT CERVICAL SPINE WO IV CONTRAST;  8/23/2024  10:38 pm   INDICATION: Signs/Symptoms:ams.   COMPARISON: None.   ACCESSION NUMBER(S): [id number]   ORDERING CLINICIAN: MIN CORTÉS   TECHNIQUE: Axial CT images of the cervical spine are obtained. Axial, coronal and sagittal reconstructions are provided for review. Severe patient motion artifact degrades images.   All CT exams are performed with 1 or more of the following dose reduction techniques: Automatic exposure control, adjustment of mA and/or kv according to patient size, or use of iterative reconstruction techniques.   FINDINGS: Patient motion artifact degrades images. There is an anterior plate with screws at C5, C6, and C7. No compression fracture or subluxation. No prevertebral soft tissue swelling. Marked disc space narrowing at C4-5 with osteophytic ridging anteriorly and posteriorly. Disc space narrowing is noted at C2-3 and C3-4 as well. Facet hypertrophy is seen at several levels.   No bony canal stenosis or bony foraminal stenosis.         1. No acute bony abnormalities 2. Images are degraded by patient motion artifact. 3. Cervical spondylosis noted. Previous anterior cervical spine fusion appears unremarkable.   MACRO: None   Signed by: Agnes Bobo 8/23/2024 11:26 PM Dictation workstation:   [id number]    CT head wo IV contrast    Result Date: 8/23/2024  Interpreted By:  Agnes Bobo, STUDY: CT HEAD WO IV CONTRAST;  8/23/2024 10:38 pm   INDICATION: Signs/Symptoms:seizure.   COMPARISON: None..   ACCESSION NUMBER(S): [id number]   ORDERING CLINICIAN: MIN CORTÉS   TECHNIQUE: CT axial images through the Brain were obtained without contrast. Images are degraded by patient motion artifact.   All CT exams are performed with 1 or more of the following dose reduction techniques: Automatic exposure control, adjustment of mA and/or kv according to patient size, or use of iterative reconstruction techniques.   FINDINGS: There is no mass effect, hemorrhage, or infarct. The ventricles appear normal.  "Gray-white differentiation is maintained. There is cerebral atrophy.   The visualized paranasal sinuses appear clear. The visualized portions of the orbits are unremarkable.       No acute intracranial abnormality. Cerebral atrophy noted. Images are degraded by patient motion artifact.   MACRO: None.   Signed by: Agnes Bobo 8/23/2024 11:22 PM Dictation workstation:   [id number]      Raul Coma Scale  Best Eye Response: Spontaneous  Best Verbal Response: Confused  Best Motor Response: Follows commands  Raul Coma Scale Score: 14      Assessment & Plan  Seizure (Multi)  Mr. Buenrostro is a 69 y.o. man who is transferred to [facility] from [facility] [place] for seizures management, initially presented to [facility] for evaluation of seizures at home. Patient had multiple seizures some lasting 15 minutes some lasting a few minutes by EMS and at home, he had another generalized seizure for seconds in ED and was given ativan and 1g IV Keppra and seizure stopped. He was coming from [place] to visit his brother in [place]. Patient reports not taking any AEM, he also reports history of brain tumor but lost follow-up and he is not on any medications for the last few years.  I spoke with patient's wife who is a poor historian and has limited insight into his medical history. Patient is from [place], [place]. \" Patient with pacemaker but he nor his wife know brand, when it was placed or why it was placed. His brother mentioned that they have financial difficulties and were unable to follow up for Mr. Buenrostro's medical conditions. Patient apparently has history of brain cancer but is not on any chemo and refusing any treatment for his brain cancer.   Patient tested positive for COVID 19 in ER.  Neurological exam shows awake, alert, oriented to time (month and year), place ([place]), person and situation. Attention intact. Speech fluent without dysphasia. Gaze conjugate, left sided facial weakness (old per patient), No adventitious movements " appreciated.  Patient adequately follows commands, no motor weakness except 4/5 left foot dorsiflexion. FNF intact with no dysmetria. CTH was competed at [facility] with no acute intracranial abnormalities, CT C-spine with no acute bony abnormalities, images are motion degraded. Per nurse, patient has been refusing taking meds in the hospital, developed seizure, Ativan and Keppra were given and seizure stopped, he refuses placing another IV line. I counseled the patient and his family about the importance of medication adherence including AEM, risk of uncontrolled seizures, stopping driving. Patient agreed to take meds after talking with his wife who is supposed to visit him later today.    Recommendations:  Continue Keppra 1 g BID  Continue home meds including AEM  MRI brain w/wo contrast  cvEEG  I recommend consulting psychiatry for management of MDD and capacity evaluation  Family and patient were informed that Mr. Buenrostro should STOP driving  I recommend consulting social work to help arrangements for care  Please contact his providers in [place] for further history and medical records  Treatment of infectious/metabolic derangements per primary team.  Seizures precautions.  Neurology will continue to follow      I spent 49 minutes in the professional and overall care of this patient.      Mahin Quevedo MD

## 2024-08-25 NOTE — CARE PLAN
The patient's goals for the shift include      The clinical goals for the shift include patient to remain free from injury throughout shift    Over the shift, the patient did not make progress toward the following goals.     Problem: Fall/Injury  Goal: Not fall by end of shift  Outcome: Progressing  Goal: Be free from injury by end of the shift  Outcome: Progressing

## 2024-08-25 NOTE — ASSESSMENT & PLAN NOTE
"Mr. Buenrostro is a 69 y.o. man who presented to [facility] for evaluation of seizures at home. Patient had multiple seizures some lasting 15 minutes some lasting a few minutes by EMS and at home, he had another generalized seizure for seconds in ED and was given ativan and 1g IV Keppra and seizure stopped. He was coming from [place] to visit his brother in [place]. Patient reports not taking any AEM, he also reports history of brain tumor but lost follow-up and he is not on any medications for the last few years.  I spoke with patient's wife who is a poor historian and has limited insight into his medical history. Patient is from [place], [place]. \" Patient with pacemaker but he nor his wife know brand, when it was placed or why it was placed. His brother mentioned that they have financial difficulties and were unable to follow up for Mr. Buenrostro's medical conditions. Patient apparently has history of brain cancer but is not on any chemo and refusing any treatment for his brain cancer.   Patient tested positive for COVID 19 in ER.  Neurological exam shows awake, alert, oriented to time (month and year), place ([place]), person and situation. Attention intact. Speech fluent without dysphasia. Gaze conjugate, left sided facial weakness (old per patient),  No adventitious movements appreciated.  Patient adequately follows commands, no motor weakness except 4/5 left foot dorsiflexion., FNF intact with no dysmetria. CTH was competed at [facility] with no acute intracranial abnormalities, CT C spine with no acute bony abnormalities, images are motion degraded. I counseled the patient and his family about the importance of medication adherence including AEM.   "

## 2024-08-25 NOTE — NURSING NOTE
Multiple attempts were made by this RN to educate the pt regarding the ordered medications, the need for an IV (keppra), and the reason for this hospital admission. The pt denies having seizures, denies that there is anything wrong with him. Frequently states that doctors have told him he does not have seizures or a history of a brain tumor. The pt says that he throws his medications in the trash at home. The pt says he does not take his medication so that he can drive and because he does not have seizures anyway. This RN educated the pt regarding this hospital admission, his surroundings, and has been told that he has had several witnessed seizures by medical staff. The pt continues to repeat he does not believe the doctors or the staff and he does not have seizures. The pt had an IV which was infiltrated and needed to be removed. This RN removed that IV. The pt is refusing IV placement stating that he does not want the medications to be given.

## 2024-08-25 NOTE — SIGNIFICANT EVENT
Rapid called for seizure activity by the 6 floor nursing staff  Patient with active tonic-clonic activity\  Physical exam:  Constitutional: Postictal after seizure  Neuro: Tonic-clonic activity  no focal deficits after history  CV: Heart is regular rate and rhythm  Pulm: Lungs are clear to auscultation B/L  GI: Abdomen is soft nontender obese  Skin: Intact  Musculoskeletal Moves all 4 extremities  No calf tenderness  Extremities: No edema.    Impression  Breakthrough seizure    Plan  Keppra given another 500 mg IV vitamin K to thousand IV then 1000 mg orally daily every 12 hours until seen by neurology  Reordered home meds including Lamictal

## 2024-08-25 NOTE — PROGRESS NOTES
Shamir Buenrostro is a 69 y.o. male on day 1 of admission presenting with Seizure (Multi).    Subjective   No acute events overnight. Patient currently refusing most medications. Notes that he does not have seizures and does not take his medications.        Objective     VITALS  Blood pressure (!) 156/94, pulse 62, temperature 36.3 °C (97.4 °F), temperature source Temporal, resp. rate 16, weight 134 kg (296 lb 4.8 oz), SpO2 99%.  Physical Exam  Vitals and nursing note reviewed.   Constitutional:       General: He is not in acute distress.     Appearance: Normal appearance. He is not ill-appearing.   HENT:      Head: Normocephalic and atraumatic.      Mouth/Throat:      Mouth: Mucous membranes are moist.      Pharynx: Oropharynx is clear.   Eyes:      Extraocular Movements: Extraocular movements intact.      Conjunctiva/sclera: Conjunctivae normal.   Cardiovascular:      Rate and Rhythm: Normal rate and regular rhythm.      Heart sounds: Normal heart sounds. No murmur heard.     No friction rub. No gallop.   Pulmonary:      Effort: Pulmonary effort is normal.      Breath sounds: Normal breath sounds. No wheezing or rales.   Abdominal:      General: Bowel sounds are normal. There is no distension.      Palpations: Abdomen is soft.      Tenderness: There is no abdominal tenderness. There is no guarding.   Musculoskeletal:         General: No swelling or tenderness.      Right lower leg: No edema.      Left lower leg: No edema.   Skin:     General: Skin is warm and dry.      Capillary Refill: Capillary refill takes less than 2 seconds.   Neurological:      General: No focal deficit present.      Mental Status: He is alert and oriented to person, place, and time.   Psychiatric:         Mood and Affect: Mood normal.           Intake/Output last 3 Shifts:  No intake/output data recorded.    Relevant Results  No results found for this or any previous visit (from the past 24 hour(s)).    Imaging Results  XR chest 1 view    Final Result   Low lung volumes and slight atelectasis or infiltrate in the medial   right lung base        Signed by: Agnes Bobo 8/24/2024 12:02 PM   Dictation workstation:   [id number]          Medications:  aspirin, 81 mg, oral, Daily  atorvastatin, 40 mg, oral, Daily  carvedilol, 3.125 mg, oral, BID  donepezil, 10 mg, oral, Nightly  DULoxetine, 30 mg, oral, Daily  fluticasone furoate-vilanteroL, 1 puff, inhalation, Daily  lamoTRIgine, 150 mg, oral, BID  levETIRAcetam, 1,000 mg, intravenous, q12h  mirtazapine, 7.5 mg, oral, Nightly  pantoprazole, 40 mg, oral, Daily before breakfast  QUEtiapine, 25 mg, oral, Nightly  remdesivir, 100 mg, intravenous, q24h  rivaroxaban, 20 mg, oral, Daily with evening meal  rOPINIRole, 8 mg, oral, Nightly  sertraline, 150 mg, oral, Nightly  venlafaxine XR, 150 mg, oral, Daily  zonisamide, 100 mg, oral, Daily       PRN medications: acetaminophen **OR** acetaminophen **OR** acetaminophen, LORazepam        Assessment/Plan          Principal Problem:    Seizure (Multi)    Seizure with post ictal state  -Neurology consulted. IV keppra, cvEEG. Neurology recommends MRI Brain but need to sort out of device seen on CXR is a leadless pacemaker or loop recorder (patient reported hx of pacemaker at one point then states he's not sure if he has pacemaker). If pacemaker will need tx to [facility] [place]  -Patient currently noting that he does not have seizures and he doesn't take his medications at home.   -Will discuss with patients wife.     COVID 19 without hypoxia  - Start remdesivir for COVID with multiple comorbidities. Does not need dexa        DVT Prophylaxis:  Xarelto    Disposition:  May need to transfer to [facility] [place] as he appears to have a pacemaker     Omar Butcher, DO [facility]

## 2024-08-26 LAB
ANION GAP SERPL CALC-SCNC: 11 MMOL/L (ref 10–20)
BUN SERPL-MCNC: 10 MG/DL (ref 6–23)
CALCIUM SERPL-MCNC: 8.9 MG/DL (ref 8.6–10.3)
CHLORIDE SERPL-SCNC: 105 MMOL/L (ref 98–107)
CO2 SERPL-SCNC: 25 MMOL/L (ref 21–32)
CREAT SERPL-MCNC: 0.79 MG/DL (ref 0.5–1.3)
EGFRCR SERPLBLD CKD-EPI 2021: >90 ML/MIN/1.73M*2
GLUCOSE SERPL-MCNC: 87 MG/DL (ref 74–99)
HOLD SPECIMEN: NORMAL
HOLD SPECIMEN: NORMAL
P OFFSET: 183 MS
P ONSET: 125 MS
POTASSIUM SERPL-SCNC: 3.8 MMOL/L (ref 3.5–5.3)
Q ONSET: 212 MS
QRS COUNT: 11 BEATS
QRS DURATION: 88 MS
QT INTERVAL: 440 MS
QTC CALCULATION(BAZETT): 450 MS
QTC FREDERICIA: 447 MS
R AXIS: 36 DEGREES
SODIUM SERPL-SCNC: 137 MMOL/L (ref 136–145)
T AXIS: 37 DEGREES
T OFFSET: 432 MS
VENTRICULAR RATE: 63 BPM

## 2024-08-26 PROCEDURE — 80048 BASIC METABOLIC PNL TOTAL CA: CPT | Performed by: INTERNAL MEDICINE

## 2024-08-26 PROCEDURE — 2500000001 HC RX 250 WO HCPCS SELF ADMINISTERED DRUGS (ALT 637 FOR MEDICARE OP): Performed by: HOSPITALIST

## 2024-08-26 PROCEDURE — 99232 SBSQ HOSP IP/OBS MODERATE 35: CPT | Performed by: INTERNAL MEDICINE

## 2024-08-26 PROCEDURE — 2500000001 HC RX 250 WO HCPCS SELF ADMINISTERED DRUGS (ALT 637 FOR MEDICARE OP): Performed by: INTERNAL MEDICINE

## 2024-08-26 PROCEDURE — 1200000002 HC GENERAL ROOM WITH TELEMETRY DAILY

## 2024-08-26 PROCEDURE — 2500000004 HC RX 250 GENERAL PHARMACY W/ HCPCS (ALT 636 FOR OP/ED): Performed by: HOSPITALIST

## 2024-08-26 PROCEDURE — 94640 AIRWAY INHALATION TREATMENT: CPT

## 2024-08-26 PROCEDURE — 99231 SBSQ HOSP IP/OBS SF/LOW 25: CPT | Performed by: PSYCHIATRY & NEUROLOGY

## 2024-08-26 PROCEDURE — 2500000005 HC RX 250 GENERAL PHARMACY W/O HCPCS: Performed by: HOSPITALIST

## 2024-08-26 PROCEDURE — 2500000004 HC RX 250 GENERAL PHARMACY W/ HCPCS (ALT 636 FOR OP/ED): Performed by: INTERNAL MEDICINE

## 2024-08-26 PROCEDURE — 36415 COLL VENOUS BLD VENIPUNCTURE: CPT | Performed by: INTERNAL MEDICINE

## 2024-08-26 PROCEDURE — 2500000002 HC RX 250 W HCPCS SELF ADMINISTERED DRUGS (ALT 637 FOR MEDICARE OP, ALT 636 FOR OP/ED): Performed by: INTERNAL MEDICINE

## 2024-08-26 RX ADMIN — LAMOTRIGINE 150 MG: 100 TABLET ORAL at 20:56

## 2024-08-26 RX ADMIN — PANTOPRAZOLE SODIUM 40 MG: 40 TABLET, DELAYED RELEASE ORAL at 05:05

## 2024-08-26 RX ADMIN — LAMOTRIGINE 150 MG: 100 TABLET ORAL at 08:52

## 2024-08-26 RX ADMIN — QUETIAPINE FUMARATE 25 MG: 25 TABLET ORAL at 20:56

## 2024-08-26 RX ADMIN — SERTRALINE 150 MG: 50 TABLET, FILM COATED ORAL at 20:56

## 2024-08-26 RX ADMIN — ATORVASTATIN CALCIUM 40 MG: 40 TABLET, FILM COATED ORAL at 08:52

## 2024-08-26 RX ADMIN — ZONISAMIDE 100 MG: 100 CAPSULE ORAL at 08:52

## 2024-08-26 RX ADMIN — CARVEDILOL 3.12 MG: 3.12 TABLET, FILM COATED ORAL at 08:52

## 2024-08-26 RX ADMIN — MIRTAZAPINE 7.5 MG: 15 TABLET, FILM COATED ORAL at 20:56

## 2024-08-26 RX ADMIN — ASPIRIN 81 MG: 81 TABLET, COATED ORAL at 08:52

## 2024-08-26 RX ADMIN — CARVEDILOL 3.12 MG: 3.12 TABLET, FILM COATED ORAL at 20:56

## 2024-08-26 RX ADMIN — VENLAFAXINE HYDROCHLORIDE 150 MG: 75 CAPSULE, EXTENDED RELEASE ORAL at 08:52

## 2024-08-26 RX ADMIN — LEVETIRACETAM 1000 MG: 10 INJECTION INTRAVENOUS at 05:04

## 2024-08-26 RX ADMIN — DULOXETINE HYDROCHLORIDE 30 MG: 30 CAPSULE, DELAYED RELEASE ORAL at 08:52

## 2024-08-26 RX ADMIN — LORAZEPAM 2 MG: 2 INJECTION INTRAMUSCULAR; INTRAVENOUS at 21:12

## 2024-08-26 RX ADMIN — ROPINIROLE HYDROCHLORIDE 8 MG: 1 TABLET, FILM COATED ORAL at 21:01

## 2024-08-26 RX ADMIN — FLUTICASONE FUROATE AND VILANTEROL TRIFENATATE 1 PUFF: 200; 25 POWDER RESPIRATORY (INHALATION) at 07:44

## 2024-08-26 RX ADMIN — RIVAROXABAN 20 MG: 20 TABLET, FILM COATED ORAL at 17:15

## 2024-08-26 RX ADMIN — LEVETIRACETAM 1000 MG: 10 INJECTION INTRAVENOUS at 17:49

## 2024-08-26 RX ADMIN — REMDESIVIR 100 MG: 100 INJECTION, POWDER, LYOPHILIZED, FOR SOLUTION INTRAVENOUS at 12:21

## 2024-08-26 RX ADMIN — DONEPEZIL HYDROCHLORIDE 10 MG: 5 TABLET ORAL at 20:56

## 2024-08-26 ASSESSMENT — COGNITIVE AND FUNCTIONAL STATUS - GENERAL
STANDING UP FROM CHAIR USING ARMS: A LOT
PERSONAL GROOMING: A LITTLE
DAILY ACTIVITIY SCORE: 19
MOBILITY SCORE: 15
CLIMB 3 TO 5 STEPS WITH RAILING: TOTAL
TOILETING: A LITTLE
DRESSING REGULAR LOWER BODY CLOTHING: A LITTLE
TURNING FROM BACK TO SIDE WHILE IN FLAT BAD: A LITTLE
DRESSING REGULAR UPPER BODY CLOTHING: A LITTLE
MOVING TO AND FROM BED TO CHAIR: A LITTLE
WALKING IN HOSPITAL ROOM: A LOT
HELP NEEDED FOR BATHING: A LITTLE

## 2024-08-26 ASSESSMENT — PAIN SCALES - GENERAL
PAINLEVEL_OUTOF10: 0 - NO PAIN
PAINLEVEL_OUTOF10: 0 - NO PAIN

## 2024-08-26 ASSESSMENT — ACTIVITIES OF DAILY LIVING (ADL): LACK_OF_TRANSPORTATION: NO

## 2024-08-26 ASSESSMENT — PAIN - FUNCTIONAL ASSESSMENT: PAIN_FUNCTIONAL_ASSESSMENT: 0-10

## 2024-08-26 NOTE — PROGRESS NOTES
Shamir Buenrostro is a 69 y.o. male on day 2 of admission presenting with Seizure (Multi).    Subjective   Had a seizure yesterday afternoon, finally agreed to take his oral AED.        Objective     VITALS  Blood pressure 97/61, pulse 55, temperature 36.4 °C (97.5 °F), temperature source Temporal, resp. rate 19, weight 134 kg (296 lb 4.8 oz), SpO2 93%.  Physical Exam  Vitals and nursing note reviewed.   Constitutional:       General: He is not in acute distress.     Appearance: Normal appearance. He is not ill-appearing.   HENT:      Head: Normocephalic and atraumatic.      Mouth/Throat:      Mouth: Mucous membranes are moist.      Pharynx: Oropharynx is clear.   Eyes:      Extraocular Movements: Extraocular movements intact.      Conjunctiva/sclera: Conjunctivae normal.   Cardiovascular:      Rate and Rhythm: Normal rate and regular rhythm.      Heart sounds: Normal heart sounds. No murmur heard.     No friction rub. No gallop.   Pulmonary:      Effort: Pulmonary effort is normal.      Breath sounds: Normal breath sounds. No wheezing or rales.   Abdominal:      General: Bowel sounds are normal. There is no distension.      Palpations: Abdomen is soft.      Tenderness: There is no abdominal tenderness. There is no guarding.   Musculoskeletal:         General: No swelling or tenderness.      Right lower leg: No edema.      Left lower leg: No edema.   Skin:     General: Skin is warm and dry.      Capillary Refill: Capillary refill takes less than 2 seconds.   Neurological:      General: No focal deficit present.      Mental Status: He is alert and oriented to person, place, and time.   Psychiatric:         Mood and Affect: Mood normal.           Intake/Output last 3 Shifts:  I/O last 3 completed shifts:  In: 200 (1.5 mL/kg) [IV Piggyback:200]  Out: 1200 (8.9 mL/kg) [Urine:1200 (0.2 mL/kg/hr)]  Weight: 134.4 kg     Relevant Results  Results for orders placed or performed during the hospital encounter of 08/24/24 (from  the past 24 hour(s))   Lavender Top   Result Value Ref Range    Extra Tube Hold for add-ons.    SST TOP   Result Value Ref Range    Extra Tube Hold for add-ons.    Basic Metabolic Panel   Result Value Ref Range    Glucose 87 74 - 99 mg/dL    Sodium 137 136 - 145 mmol/L    Potassium 3.8 3.5 - 5.3 mmol/L    Chloride 105 98 - 107 mmol/L    Bicarbonate 25 21 - 32 mmol/L    Anion Gap 11 10 - 20 mmol/L    Urea Nitrogen 10 6 - 23 mg/dL    Creatinine 0.79 0.50 - 1.30 mg/dL    eGFR >90 >60 mL/min/1.73m*2    Calcium 8.9 8.6 - 10.3 mg/dL       Imaging Results  XR chest 1 view   Final Result   Low lung volumes and slight atelectasis or infiltrate in the medial   right lung base        Signed by: Agnes Bobo 8/24/2024 12:02 PM   Dictation workstation:   [id number]      Cardiac device check - Inpatient    (Results Pending)       Medications:  aspirin, 81 mg, oral, Daily  atorvastatin, 40 mg, oral, Daily  carvedilol, 3.125 mg, oral, BID  donepezil, 10 mg, oral, Nightly  DULoxetine, 30 mg, oral, Daily  fluticasone furoate-vilanteroL, 1 puff, inhalation, Daily  lamoTRIgine, 150 mg, oral, BID  levETIRAcetam, 1,000 mg, intravenous, q12h  mirtazapine, 7.5 mg, oral, Nightly  pantoprazole, 40 mg, oral, Daily before breakfast  QUEtiapine, 25 mg, oral, Nightly  remdesivir, 100 mg, intravenous, q24h  rivaroxaban, 20 mg, oral, Daily with evening meal  rOPINIRole, 8 mg, oral, Nightly  sertraline, 150 mg, oral, Nightly  venlafaxine XR, 150 mg, oral, Daily  zonisamide, 100 mg, oral, Daily       PRN medications: acetaminophen **OR** acetaminophen **OR** acetaminophen, LORazepam        Assessment/Plan          Principal Problem:    Seizure (Multi)    Seizure with post ictal state  -Neurology consulted. IV keppra, cvEEG. Neurology recommends MRI Brain, however he does have a pacemaker. If truly needs MRI needs to be transferred. Appreciate neuros thoughts on this   -Psych consulted as there is concern that patient may not have capacity     COVID  19 without hypoxia  - Start remdesivir for COVID with multiple comorbidities. Does not need dexa        DVT Prophylaxis:  Xarelto    Disposition:  May need to transfer to [facility] for MRI as he appears to have a pacemaker     Omar Butcher, [facility]

## 2024-08-26 NOTE — CARE PLAN
The patient's goals for the shift include      The clinical goals for the shift include Patient will remain safe this shift    Over the shift, the patient did not make progress toward the following goals. Barriers to progression include . Recommendations to address these barriers include   Problem: Fall/Injury  Goal: Not fall by end of shift  Outcome: Progressing  Goal: Be free from injury by end of the shift  Outcome: Progressing  Goal: Verbalize understanding of personal risk factors for fall in the hospital  Outcome: Progressing  Goal: Verbalize understanding of risk factor reduction measures to prevent injury from fall in the home  Outcome: Progressing  Goal: Use assistive devices by end of the shift  Outcome: Progressing  Goal: Pace activities to prevent fatigue by end of the shift  Outcome: Progressing   .

## 2024-08-26 NOTE — PROGRESS NOTES
08/26/24 1707   Discharge Planning   Living Arrangements Spouse/significant other   Support Systems Children;Spouse/significant other  (Brother; Billy March; [phone])   Assistance Needed PTA; Independent w/ADL, wheelchair bound   Type of Residence Private residence  (demo correct)   Expected Discharge Disposition Other Fac  ([place] [facility] [place] appears to have a pacemaker)   Does the patient need discharge transport arranged? Yes   RoundTrip coordination needed? Yes   Has discharge transport been arranged? No   Financial Resource Strain   How hard is it for you to pay for the very basics like food, housing, medical care, and heating? Pt Unable   Housing Stability   In the last 12 months, was there a time when you were not able to pay the mortgage or rent on time? N   At any time in the past 12 months, were you homeless or living in a shelter (including now)? N   Transportation Needs   In the past 12 months, has lack of transportation kept you from medical appointments or from getting medications? no  (PCP; Dr. Bains [facility])   In the past 12 months, has lack of transportation kept you from meetings, work, or from getting things needed for daily living? No     Care Coordinator Note:  TCC spoke to patient's brother, if patient does not [place] [facility], patient will go to brother's home, until plan to be driven or fly back to [place]  Plan: Neurology consulted; multiple seizure-like events, recommends MRI Brain, however he does have a pacemaker.  MRI needs to be transferred   Status: Inpatient d/t seizure  Payor: [employer]  Barriers: patient lives in [facility]  Kristel RUSHING, RN [facility]

## 2024-08-26 NOTE — NURSING NOTE
Rapid Response Nurse Note:     Shamir Buenrostro is a 69 y.o. male on day 2 of admission presenting with Seizure (Multi).    Rounded on patient due to recent rapid response, reviewed patient chart and checked with bedside nurse for any questions or concerns none voiced at this time.    Patient current RADAR score is 0    /69 (BP Location: Right arm, Patient Position: Lying)   Pulse 63   Temp 36.4 °C (97.6 °F) (Temporal)   Resp 20   Wt 134 kg (296 lb 4.8 oz)   SpO2 94%     No signs/symptoms of distress at this time. Bedside nurse notified to escalate concerns if patient condition changes      Leonor Valerio CCRN

## 2024-08-26 NOTE — ASSESSMENT & PLAN NOTE
This gentleman presented with multiple seizure-like events but does not appear actively seizing at present.    Given that he is in COVID isolation and does not appear to be in status, I think continuous EEG can be deferred for the time being.  Communicated with the technologist.    Recommend continuing current anticonvulsant regimen.    He is not sharply oriented to place, date or situation but is on donepezil and psychiatric medications at baseline and this may be his cognitive baseline.    Following and will reassess.

## 2024-08-26 NOTE — NURSING NOTE
Rapid Response Nurse note:     1812: Called by Charge RN Rosalie Barrios RN who was with the patient attempting to place IV, for seizure activity. Patient lost IV access earlier in the morning and both myself and SHERICE Wiggins had come up to attempt IV access for scheduled IV Keppra to which patient refused; Dr. Guadalupe DO was notified by patient's Primary RN. Patient also refusing PO medications including Lamictal. The patient mentioned that he did not believe that he has seizures; he has been getting conflicting information from his doctors in [place] where the patient lives, and the doctors here in [place] when he comes to visit family, as to whether he truly has seizures or not.     I came up to the room at 1813 and we gave the patient PRN IM Ativan due to no IV access. Seizure like activity continued despite Ativan administration so Rapid Response called at 1816 for a Physician to assess and help manage the patient. The patient had jerking movement in bilateral legs and rhythmic movement in bilateral arms while staring off. The patient's vital signs remained stable and patient was able to protect airway entire time; SPO2 99% on room air. Seizure like activity stopped at 1819 with Dr. Wesley MD at bedside.     1821: Patient noticed to have the same seizure like activity again; I was able to obtain IV access and IV Ativan was given at 1822. Seizure like activity stopped at 1824. The PRN Ativan previously ordered IM was changed to IV per Dr. Wesley MD for future needs. If patient has increasing frequency of seizure like activity and or lengthening time of seizure like activity, patient may need ICU care. Will continue to monitor closely. SHERICE Winter and SHERICE Curiel Charge Nurse aware. I also updated the patient's Attending, Dr. Guadalupe DO.       1900: I checked on the patient at shift change and assessed IV with oncoming Nurse. Night shift Rapid Response Nurse SHERICE Galvez updated on patient.

## 2024-08-26 NOTE — PROGRESS NOTES
"Shamir Buenrostro is a 69 y.o. male on day 2 of admission presenting with Seizure (Multi).      Subjective   Patient signed out by Dr. Quevedo. Presented 8/23 with multiple seizure-like events in context of past history of epilepsy, not recently compliant with anticonvulsant medication, and reportedly history of \"brain cancer\" for which per chart patient has refused chemotherapy or other treatment.  He was noted to be COVID-positive on presentation here.  Here he is on a multidrug regimen including levetiracetam 1 g every 12 hours, lamotrigine 150 mg twice daily, zonisamide 100 mg daily.    He is in COVID isolation currently, awake and conversant but not oriented.  He indicates no pain or other complaints except needing to have a bowel movement.       Objective     Last Recorded Vitals  Blood pressure 97/61, pulse 55, temperature 36.4 °C (97.5 °F), temperature source Temporal, resp. rate 19, weight 134 kg (296 lb 4.8 oz), SpO2 93%.    Physical Exam  Neurological Exam    General: Sitting up in bed in no acute distress.  Not on supplemental oxygen.  Speaking comfortably without dyspnea or staccato.    Mental status: Awake throughout encounter without sensorial fluctuations.  Flat affect with monotonous voice and lack of facial expression.  Sustained eye contact.  Oriented to self, guesses that the month is July and the year 2023, oriented to [place] but not the city.  Indicates that he lives in [place].  Taciturn but without paraphasic errors in limited verbal output.    Cranial nerves: Extraocular movements intact and conjugate without nystagmus, but some difficulty getting him to pursue smoothly and consistently.  No gaze deviation.  No facial motor asymmetry.  Grossly intact hearing for purposes of conversation.  No dysarthria.    Motor: No pronator drift.    Coordination: No postural or rest tremor, myoclonus or dystonic posturing.    Relevant Results    Noncontrast head CT reviewed and without notable findings but " with limited image quality due to motion artifact.    Brain MRI was recommended by Neurology but cannot be done here due to pacemaker.                      Taylor Coma Scale  Best Eye Response: Spontaneous  Best Verbal Response: Confused  Best Motor Response: Follows commands  Raul Coma Scale Score: 14                             Assessment/Plan      Assessment & Plan  Seizure (Multi)    This gentleman presented with multiple seizure-like events but does not appear actively seizing at present.    Given that he is in COVID isolation and does not appear to be in status, I think continuous EEG can be deferred for the time being.  Communicated with the technologist.    Recommend continuing current anticonvulsant regimen.    He is not sharply oriented to place, date or situation but is on donepezil and psychiatric medications at baseline and this may be his cognitive baseline.    Following and will reassess.                   Barber Cedeño MD

## 2024-08-27 ENCOUNTER — APPOINTMENT (OUTPATIENT)
Dept: CARDIOLOGY | Facility: HOSPITAL | Age: 69
DRG: 100 | End: 2024-08-27
Payer: MEDICARE

## 2024-08-27 LAB
CARDIAC TROPONIN I PNL SERPL HS: 4 NG/L (ref 0–20)
TSH SERPL-ACNC: 0.39 MIU/L (ref 0.44–3.98)

## 2024-08-27 PROCEDURE — 2500000004 HC RX 250 GENERAL PHARMACY W/ HCPCS (ALT 636 FOR OP/ED): Performed by: INTERNAL MEDICINE

## 2024-08-27 PROCEDURE — 84484 ASSAY OF TROPONIN QUANT: CPT | Performed by: STUDENT IN AN ORGANIZED HEALTH CARE EDUCATION/TRAINING PROGRAM

## 2024-08-27 PROCEDURE — 94640 AIRWAY INHALATION TREATMENT: CPT

## 2024-08-27 PROCEDURE — 2500000001 HC RX 250 WO HCPCS SELF ADMINISTERED DRUGS (ALT 637 FOR MEDICARE OP): Performed by: INTERNAL MEDICINE

## 2024-08-27 PROCEDURE — 93005 ELECTROCARDIOGRAM TRACING: CPT

## 2024-08-27 PROCEDURE — 1200000002 HC GENERAL ROOM WITH TELEMETRY DAILY

## 2024-08-27 PROCEDURE — 2500000005 HC RX 250 GENERAL PHARMACY W/O HCPCS: Mod: JZ | Performed by: HOSPITALIST

## 2024-08-27 PROCEDURE — 2500000001 HC RX 250 WO HCPCS SELF ADMINISTERED DRUGS (ALT 637 FOR MEDICARE OP): Performed by: HOSPITALIST

## 2024-08-27 PROCEDURE — 36415 COLL VENOUS BLD VENIPUNCTURE: CPT | Performed by: STUDENT IN AN ORGANIZED HEALTH CARE EDUCATION/TRAINING PROGRAM

## 2024-08-27 PROCEDURE — 99231 SBSQ HOSP IP/OBS SF/LOW 25: CPT | Performed by: PSYCHIATRY & NEUROLOGY

## 2024-08-27 PROCEDURE — 2500000002 HC RX 250 W HCPCS SELF ADMINISTERED DRUGS (ALT 637 FOR MEDICARE OP, ALT 636 FOR OP/ED): Performed by: INTERNAL MEDICINE

## 2024-08-27 PROCEDURE — 2500000004 HC RX 250 GENERAL PHARMACY W/ HCPCS (ALT 636 FOR OP/ED): Performed by: HOSPITALIST

## 2024-08-27 PROCEDURE — 84443 ASSAY THYROID STIM HORMONE: CPT | Performed by: STUDENT IN AN ORGANIZED HEALTH CARE EDUCATION/TRAINING PROGRAM

## 2024-08-27 PROCEDURE — 99231 SBSQ HOSP IP/OBS SF/LOW 25: CPT | Performed by: STUDENT IN AN ORGANIZED HEALTH CARE EDUCATION/TRAINING PROGRAM

## 2024-08-27 PROCEDURE — 93010 ELECTROCARDIOGRAM REPORT: CPT | Performed by: INTERNAL MEDICINE

## 2024-08-27 RX ADMIN — QUETIAPINE FUMARATE 25 MG: 25 TABLET ORAL at 20:32

## 2024-08-27 RX ADMIN — PANTOPRAZOLE SODIUM 40 MG: 40 TABLET, DELAYED RELEASE ORAL at 05:32

## 2024-08-27 RX ADMIN — LEVETIRACETAM 1000 MG: 10 INJECTION INTRAVENOUS at 05:32

## 2024-08-27 RX ADMIN — FLUTICASONE FUROATE AND VILANTEROL TRIFENATATE 1 PUFF: 200; 25 POWDER RESPIRATORY (INHALATION) at 07:35

## 2024-08-27 RX ADMIN — DULOXETINE HYDROCHLORIDE 30 MG: 30 CAPSULE, DELAYED RELEASE ORAL at 08:47

## 2024-08-27 RX ADMIN — ASPIRIN 81 MG: 81 TABLET, COATED ORAL at 08:48

## 2024-08-27 RX ADMIN — ROPINIROLE HYDROCHLORIDE 8 MG: 1 TABLET, FILM COATED ORAL at 20:34

## 2024-08-27 RX ADMIN — LEVETIRACETAM 1000 MG: 10 INJECTION INTRAVENOUS at 17:17

## 2024-08-27 RX ADMIN — SERTRALINE 150 MG: 50 TABLET, FILM COATED ORAL at 20:33

## 2024-08-27 RX ADMIN — ATORVASTATIN CALCIUM 40 MG: 40 TABLET, FILM COATED ORAL at 08:48

## 2024-08-27 RX ADMIN — LAMOTRIGINE 150 MG: 100 TABLET ORAL at 08:48

## 2024-08-27 RX ADMIN — REMDESIVIR 100 MG: 100 INJECTION, POWDER, LYOPHILIZED, FOR SOLUTION INTRAVENOUS at 13:02

## 2024-08-27 RX ADMIN — ACETAMINOPHEN 650 MG: 325 TABLET ORAL at 20:33

## 2024-08-27 RX ADMIN — DONEPEZIL HYDROCHLORIDE 10 MG: 5 TABLET ORAL at 20:33

## 2024-08-27 RX ADMIN — VENLAFAXINE HYDROCHLORIDE 150 MG: 75 CAPSULE, EXTENDED RELEASE ORAL at 08:47

## 2024-08-27 RX ADMIN — MIRTAZAPINE 7.5 MG: 15 TABLET, FILM COATED ORAL at 20:33

## 2024-08-27 RX ADMIN — LAMOTRIGINE 150 MG: 100 TABLET ORAL at 20:32

## 2024-08-27 RX ADMIN — RIVAROXABAN 20 MG: 20 TABLET, FILM COATED ORAL at 17:20

## 2024-08-27 RX ADMIN — ZONISAMIDE 100 MG: 100 CAPSULE ORAL at 08:47

## 2024-08-27 ASSESSMENT — COGNITIVE AND FUNCTIONAL STATUS - GENERAL
HELP NEEDED FOR BATHING: A LOT
MOVING FROM LYING ON BACK TO SITTING ON SIDE OF FLAT BED WITH BEDRAILS: A LITTLE
DAILY ACTIVITIY SCORE: 13
EATING MEALS: A LITTLE
WALKING IN HOSPITAL ROOM: A LOT
MOVING FROM LYING ON BACK TO SITTING ON SIDE OF FLAT BED WITH BEDRAILS: A LITTLE
CLIMB 3 TO 5 STEPS WITH RAILING: TOTAL
TURNING FROM BACK TO SIDE WHILE IN FLAT BAD: A LITTLE
DRESSING REGULAR LOWER BODY CLOTHING: A LOT
STANDING UP FROM CHAIR USING ARMS: A LITTLE
DAILY ACTIVITIY SCORE: 14
MOBILITY SCORE: 15
DRESSING REGULAR LOWER BODY CLOTHING: A LOT
MOBILITY SCORE: 15
TURNING FROM BACK TO SIDE WHILE IN FLAT BAD: A LITTLE
MOVING TO AND FROM BED TO CHAIR: A LITTLE
STANDING UP FROM CHAIR USING ARMS: A LITTLE
DRESSING REGULAR UPPER BODY CLOTHING: A LOT
PERSONAL GROOMING: A LOT
PERSONAL GROOMING: A LOT
DRESSING REGULAR UPPER BODY CLOTHING: A LOT
HELP NEEDED FOR BATHING: A LOT
MOVING TO AND FROM BED TO CHAIR: A LITTLE
TOILETING: A LOT
WALKING IN HOSPITAL ROOM: A LOT
TOILETING: A LOT
CLIMB 3 TO 5 STEPS WITH RAILING: TOTAL

## 2024-08-27 ASSESSMENT — PAIN SCALES - GENERAL
PAINLEVEL_OUTOF10: 6
PAINLEVEL_OUTOF10: 4
PAINLEVEL_OUTOF10: 0 - NO PAIN

## 2024-08-27 ASSESSMENT — PAIN SCALES - PAIN ASSESSMENT IN ADVANCED DEMENTIA (PAINAD): TOTALSCORE: MEDICATION (SEE MAR)

## 2024-08-27 ASSESSMENT — PAIN - FUNCTIONAL ASSESSMENT
PAIN_FUNCTIONAL_ASSESSMENT: 0-10
PAIN_FUNCTIONAL_ASSESSMENT: 0-10

## 2024-08-27 NOTE — PROGRESS NOTES
Shamir Buenrostro is a 69 y.o. male on day 3 of admission presenting with Seizure (Multi).      Subjective   Sitting up in bed, talking on the phone when I entered the room.  No complaints except some exacerbation of his chronic low back pain.       Objective     Last Recorded Vitals  Blood pressure 132/74, pulse 60, temperature 36.2 °C (97.1 °F), temperature source Temporal, resp. rate 16, weight 134 kg (296 lb 4.8 oz), SpO2 98%.    Physical Exam  Neurological Exam    Clear sensorium without fluctuation.  Appropriate in response to questions.  Quicker to respond than yesterday.  Oriented to self, year, not oriented to month.  Aware that he is in the hospital but not able to name the hospital.  Oriented to [place], not the city.  Indicates that he has not been here before and that he is from [place].  No paraphasic errors or hesitancy.  No gaze deviation or ptosis.  Symmetric facial motor function.  No dysarthria.  No perioral or limb twitching or posturing.    Relevant Results                    [place] Coma Scale  Best Eye Response: Spontaneous  Best Verbal Response: Confused  Best Motor Response: Follows commands  Raul Coma Scale Score: 14                             Assessment/Plan      Assessment & Plan  Seizure (Multi)    He appears neurologically stable at present and his seizures appear to be under control on the present anticonvulsant regimen.    He is not precisely oriented but does have baseline cognitive dysfunction for which he has been maintained on donepezil.    MRI cannot be done here due to pacemaker.    He does not appear to warrant continuous EEG at this time.    As discharge is anticipated, I have communicated to the primary service that I would recommend continuing the present anticonvulsant regimen and having him seen by Epilepsy subspecialty neurology in the near future as an outpatient.    Unclear if he has done any driving recently, but in any event he should not drive until further  notice.                   Barber Cedeño MD

## 2024-08-27 NOTE — NURSING NOTE
When attempting to give HS meds patient's  right hand was noted to be repetitively  tapping, speech became slurred and his gaze was becoming fixed. Patient then no longer responded to verbal commands. IV Ativan given per order with good effect. Patient able to respond with some delay, Seizure lasted 5 mins.

## 2024-08-27 NOTE — PROGRESS NOTES
"Shamir Buenrostro is a 69 y.o. male on day 3 of admission presenting with Seizure (Multi).      Subjective   Per my evaluation patient is still encephalopathic 2/2 to seizure. Unable to assess for capacity. However during conversation patient was able to express understanding and choice. However patient's medical literacy is low. He was still able to tell me that he made a choice not to take his seizure medication because of distrust in his medical provider team. He expressed that since he has been in the hospital he has changed his mind about taking medications because he wants to \"Stay alive for his wife\"  Patient does appear to be lethargic however and per nursing was \"opening his soda with a knife\" Patient still unsafe for discharge at this time. Patient complaining of 8/10 substernal chest pain. Troponin ordered. EKG sinus bradycardia.          Objective     Last Recorded Vitals  /74 (BP Location: Right arm, Patient Position: Lying)   Pulse 60   Temp 36.2 °C (97.1 °F) (Temporal)   Resp 16   Wt 134 kg (296 lb 4.8 oz)   SpO2 98%   Intake/Output last 3 Shifts:    Intake/Output Summary (Last 24 hours) at 8/27/2024 1350  Last data filed at 8/27/2024 1000  Gross per 24 hour   Intake 1070 ml   Output 650 ml   Net 420 ml       Admission Weight  Weight: 134 kg (296 lb 4.8 oz) (08/25/24 0700)    Daily Weight  08/25/24 : 134 kg (296 lb 4.8 oz)    Image Results  ECG 12 Lead  Junctional rhythm  Abnormal ECG  No previous ECGs available  See ED provider note for full interpretation and clinical correlation  Confirmed by Kylie Guerrero ([id number]) on 8/26/2024 4:21:28 PM  Electrocardiogram, 12-lead PRN ACS symptoms  Marked sinus bradycardia  Low voltage QRS  Abnormal ECG  When compared with ECG of 23-AUG-2024 21:54, (unconfirmed)  Sinus rhythm has replaced Junctional rhythm      Physical Exam  Physical Exam  Vitals and nursing note reviewed.   Constitutional:       General: He is not in acute distress.     " Appearance: Normal appearance. He is not ill-appearing.   HENT:      Head: Normocephalic and atraumatic.      Mouth/Throat:      Mouth: Mucous membranes are moist.      Pharynx: Oropharynx is clear.   Eyes:      Extraocular Movements: Extraocular movements intact.      Conjunctiva/sclera: Conjunctivae normal.   Cardiovascular:      Rate and Rhythm: Normal rate and regular rhythm.      Heart sounds: Normal heart sounds. No murmur heard.     No friction rub. No gallop.   Pulmonary:      Effort: Pulmonary effort is normal.      Breath sounds: Normal breath sounds. No wheezing or rales.   Abdominal:      General: Bowel sounds are normal. There is no distension.      Palpations: Abdomen is soft.      Tenderness: There is no abdominal tenderness. There is no guarding.   Musculoskeletal:         General: No swelling or tenderness.      Right lower leg: No edema.      Left lower leg: No edema.   Skin:     General: Skin is warm and dry.      Capillary Refill: Capillary refill takes less than 2 seconds.   Neurological:      General: No focal deficit present.      Mental Status: He is alert and oriented to person, place, and time.   Psychiatric:         Mood and Affect: Mood normal.      Relevant Results               Assessment/Plan                  Assessment & Plan  Seizure (Multi)    #Seizure with post ictal state  #encephalopathy resolving   -Neurology consulted. IV keppra, cvEEG. Neurology recommends MRI Brain, however he does have a pacemaker. Per neuro can defer MRI at this time.   - Patient appears to have capacity ( see subjective section for conversation) however as patient has waxing/waning encephalopathy can not make full determination    #sinus bradycardia  - patient has Medtronic leadless pacemaker      COVID 19 without hypoxia  - Start remdesivir for COVID with multiple comorbidities. Does not need dexa           DVT Prophylaxis:  Xarelto     Disposition:  Discharge tomorrow pending patient is less  encephalopathic                  Malka Gonzalez MD

## 2024-08-27 NOTE — NURSING NOTE
Rapid Response Nurse Note:     Shamir Buenrostro is a 69 y.o. male on day 3 of admission presenting with Seizure (Multi).    Rounded on patient due to recent rapid response, reviewed patient chart and checked with bedside nurse for any questions or concerns none voiced at this time. Bedside nurse reported patient had another seizure but received ativan per orders that was effective     Patient current RADAR score is 0    /73 (BP Location: Right arm, Patient Position: Lying)   Pulse 60   Temp 36.1 °C (97 °F) (Temporal)   Resp 16   Wt 134 kg (296 lb 4.8 oz)   SpO2 96%     No signs/symptoms of distress at this time. Bedside nurse notified to escalate concerns if patient condition changes      Dena Ramirez RN

## 2024-08-27 NOTE — ASSESSMENT & PLAN NOTE
He appears neurologically stable at present and his seizures appear to be under control on the present anticonvulsant regimen.    He is not precisely oriented but does have baseline cognitive dysfunction for which he has been maintained on donepezil.    MRI cannot be done here due to pacemaker.    He does not appear to warrant continuous EEG at this time.    As discharge is anticipated, I have communicated to the primary service that I would recommend continuing the present anticonvulsant regimen and having him seen by Epilepsy subspecialty neurology in the near future as an outpatient.    Unclear if he has done any driving recently, but in any event he should not drive until further notice.

## 2024-08-28 ENCOUNTER — APPOINTMENT (OUTPATIENT)
Dept: NEUROLOGY | Facility: HOSPITAL | Age: 69
DRG: 100 | End: 2024-08-28
Payer: MEDICARE

## 2024-08-28 ENCOUNTER — APPOINTMENT (OUTPATIENT)
Dept: RADIOLOGY | Facility: HOSPITAL | Age: 69
DRG: 100 | End: 2024-08-28
Payer: MEDICARE

## 2024-08-28 LAB
ATRIAL RATE: 46 BPM
GLUCOSE BLD MANUAL STRIP-MCNC: 135 MG/DL (ref 74–99)
P AXIS: 16 DEGREES
P OFFSET: 157 MS
P ONSET: 94 MS
PR INTERVAL: 230 MS
Q ONSET: 209 MS
QRS COUNT: 8 BEATS
QRS DURATION: 100 MS
QT INTERVAL: 500 MS
QTC CALCULATION(BAZETT): 437 MS
QTC FREDERICIA: 457 MS
R AXIS: -4 DEGREES
T AXIS: -4 DEGREES
T OFFSET: 459 MS
VENTRICULAR RATE: 46 BPM

## 2024-08-28 PROCEDURE — 70450 CT HEAD/BRAIN W/O DYE: CPT | Performed by: RADIOLOGY

## 2024-08-28 PROCEDURE — 2500000004 HC RX 250 GENERAL PHARMACY W/ HCPCS (ALT 636 FOR OP/ED): Performed by: PSYCHIATRY & NEUROLOGY

## 2024-08-28 PROCEDURE — 95819 EEG AWAKE AND ASLEEP: CPT | Performed by: STUDENT IN AN ORGANIZED HEALTH CARE EDUCATION/TRAINING PROGRAM

## 2024-08-28 PROCEDURE — 2500000004 HC RX 250 GENERAL PHARMACY W/ HCPCS (ALT 636 FOR OP/ED): Performed by: INTERNAL MEDICINE

## 2024-08-28 PROCEDURE — 2500000004 HC RX 250 GENERAL PHARMACY W/ HCPCS (ALT 636 FOR OP/ED): Performed by: HOSPITALIST

## 2024-08-28 PROCEDURE — 2500000005 HC RX 250 GENERAL PHARMACY W/O HCPCS: Mod: JZ | Performed by: HOSPITALIST

## 2024-08-28 PROCEDURE — 2500000001 HC RX 250 WO HCPCS SELF ADMINISTERED DRUGS (ALT 637 FOR MEDICARE OP): Performed by: INTERNAL MEDICINE

## 2024-08-28 PROCEDURE — 82947 ASSAY GLUCOSE BLOOD QUANT: CPT

## 2024-08-28 PROCEDURE — 99233 SBSQ HOSP IP/OBS HIGH 50: CPT | Performed by: INTERNAL MEDICINE

## 2024-08-28 PROCEDURE — 1200000002 HC GENERAL ROOM WITH TELEMETRY DAILY

## 2024-08-28 PROCEDURE — 70450 CT HEAD/BRAIN W/O DYE: CPT

## 2024-08-28 PROCEDURE — 2500000002 HC RX 250 W HCPCS SELF ADMINISTERED DRUGS (ALT 637 FOR MEDICARE OP, ALT 636 FOR OP/ED): Performed by: INTERNAL MEDICINE

## 2024-08-28 PROCEDURE — 2500000001 HC RX 250 WO HCPCS SELF ADMINISTERED DRUGS (ALT 637 FOR MEDICARE OP): Performed by: HOSPITALIST

## 2024-08-28 PROCEDURE — 94640 AIRWAY INHALATION TREATMENT: CPT

## 2024-08-28 PROCEDURE — 95819 EEG AWAKE AND ASLEEP: CPT

## 2024-08-28 RX ORDER — LEVETIRACETAM 15 MG/ML
1500 INJECTION INTRAVASCULAR EVERY 12 HOURS
Status: DISCONTINUED | OUTPATIENT
Start: 2024-08-28 | End: 2024-09-02 | Stop reason: HOSPADM

## 2024-08-28 RX ADMIN — CARVEDILOL 3.12 MG: 3.12 TABLET, FILM COATED ORAL at 20:40

## 2024-08-28 RX ADMIN — LORAZEPAM 2 MG: 2 INJECTION INTRAMUSCULAR; INTRAVENOUS at 15:01

## 2024-08-28 RX ADMIN — QUETIAPINE FUMARATE 25 MG: 25 TABLET ORAL at 20:40

## 2024-08-28 RX ADMIN — LAMOTRIGINE 150 MG: 100 TABLET ORAL at 12:18

## 2024-08-28 RX ADMIN — LEVETIRACETAM 1000 MG: 10 INJECTION INTRAVENOUS at 06:27

## 2024-08-28 RX ADMIN — LEVETIRACETAM 1500 MG: 15 INJECTION INTRAVENOUS at 18:17

## 2024-08-28 RX ADMIN — VENLAFAXINE HYDROCHLORIDE 150 MG: 75 CAPSULE, EXTENDED RELEASE ORAL at 12:18

## 2024-08-28 RX ADMIN — REMDESIVIR 100 MG: 100 INJECTION, POWDER, LYOPHILIZED, FOR SOLUTION INTRAVENOUS at 12:27

## 2024-08-28 RX ADMIN — RIVAROXABAN 20 MG: 20 TABLET, FILM COATED ORAL at 18:17

## 2024-08-28 RX ADMIN — PANTOPRAZOLE SODIUM 40 MG: 40 TABLET, DELAYED RELEASE ORAL at 06:28

## 2024-08-28 RX ADMIN — ZONISAMIDE 100 MG: 100 CAPSULE ORAL at 12:18

## 2024-08-28 RX ADMIN — DULOXETINE HYDROCHLORIDE 30 MG: 30 CAPSULE, DELAYED RELEASE ORAL at 12:18

## 2024-08-28 RX ADMIN — FLUTICASONE FUROATE AND VILANTEROL TRIFENATATE 1 PUFF: 200; 25 POWDER RESPIRATORY (INHALATION) at 07:55

## 2024-08-28 RX ADMIN — ASPIRIN 81 MG: 81 TABLET, COATED ORAL at 12:18

## 2024-08-28 RX ADMIN — ATORVASTATIN CALCIUM 40 MG: 40 TABLET, FILM COATED ORAL at 12:18

## 2024-08-28 RX ADMIN — MIRTAZAPINE 7.5 MG: 15 TABLET, FILM COATED ORAL at 20:42

## 2024-08-28 RX ADMIN — ROPINIROLE HYDROCHLORIDE 8 MG: 1 TABLET, FILM COATED ORAL at 20:40

## 2024-08-28 RX ADMIN — DONEPEZIL HYDROCHLORIDE 10 MG: 5 TABLET ORAL at 20:40

## 2024-08-28 RX ADMIN — SERTRALINE 150 MG: 50 TABLET, FILM COATED ORAL at 20:41

## 2024-08-28 RX ADMIN — LAMOTRIGINE 150 MG: 100 TABLET ORAL at 20:40

## 2024-08-28 ASSESSMENT — COGNITIVE AND FUNCTIONAL STATUS - GENERAL
MOVING TO AND FROM BED TO CHAIR: A LITTLE
DAILY ACTIVITIY SCORE: 14
TOILETING: A LOT
PERSONAL GROOMING: A LITTLE
DRESSING REGULAR UPPER BODY CLOTHING: A LOT
DAILY ACTIVITIY SCORE: 15
WALKING IN HOSPITAL ROOM: A LOT
CLIMB 3 TO 5 STEPS WITH RAILING: TOTAL
DRESSING REGULAR LOWER BODY CLOTHING: A LOT
STANDING UP FROM CHAIR USING ARMS: A LOT
TURNING FROM BACK TO SIDE WHILE IN FLAT BAD: A LITTLE
WALKING IN HOSPITAL ROOM: A LOT
CLIMB 3 TO 5 STEPS WITH RAILING: TOTAL
TURNING FROM BACK TO SIDE WHILE IN FLAT BAD: A LITTLE
DRESSING REGULAR LOWER BODY CLOTHING: A LOT
TOILETING: A LOT
MOVING FROM LYING ON BACK TO SITTING ON SIDE OF FLAT BED WITH BEDRAILS: A LITTLE
MOVING FROM LYING ON BACK TO SITTING ON SIDE OF FLAT BED WITH BEDRAILS: A LITTLE
STANDING UP FROM CHAIR USING ARMS: A LITTLE
PERSONAL GROOMING: A LOT
HELP NEEDED FOR BATHING: TOTAL
MOBILITY SCORE: 15
HELP NEEDED FOR BATHING: A LOT
MOBILITY SCORE: 13
DRESSING REGULAR UPPER BODY CLOTHING: A LITTLE
MOVING TO AND FROM BED TO CHAIR: A LOT

## 2024-08-28 ASSESSMENT — PAIN SCALES - GENERAL
PAINLEVEL_OUTOF10: 0 - NO PAIN
PAINLEVEL_OUTOF10: 0 - NO PAIN

## 2024-08-28 NOTE — PROGRESS NOTES
08/28/24 1504   Discharge Planning   Support Systems Family members  (will return to brother's home until back to [place])   Assistance Needed per IM chart review;#Seizure with post ictal state  #encephalopathy resolving   -continue iv keppra, needs routine eeg   Expected Discharge Disposition Home   RoundTrip coordination needed? Yes   Has discharge transport been arranged? No

## 2024-08-28 NOTE — CARE PLAN
The patient's goals for the shift include  sleep comfortable    The clinical goals for the shift include remain resp and neuro stable

## 2024-08-28 NOTE — PROGRESS NOTES
08/28/24 1511   Current Planned Discharge Disposition   Current Planned Discharge Disposition Home

## 2024-08-28 NOTE — PROGRESS NOTES
Shamir Buenrostro is a 69 y.o. male on day 4 of admission presenting with Seizure (Multi).    Subjective   Rapid called for patient not responding, patient was staring off, eyes dilated but reactive to light       Objective     Physical Exam  Vitals reviewed.   Constitutional:       Appearance: Normal appearance.   HENT:      Head: Normocephalic.      Nose: Nose normal.      Mouth/Throat:      Mouth: Mucous membranes are moist.   Cardiovascular:      Rate and Rhythm: Normal rate and regular rhythm.   Pulmonary:      Effort: Pulmonary effort is normal.   Abdominal:      General: Abdomen is flat. Bowel sounds are normal.      Palpations: Abdomen is soft.   Skin:     Capillary Refill: Capillary refill takes less than 2 seconds.   Neurological:      General: No focal deficit present.      Mental Status: He is alert.         Last Recorded Vitals  Blood pressure (!) 143/94, pulse 51, temperature 36.4 °C (97.6 °F), temperature source Temporal, resp. rate 16, weight 134 kg (296 lb 4.8 oz), SpO2 96%.  Intake/Output last 3 Shifts:  I/O last 3 completed shifts:  In: 1640 (12.2 mL/kg) [P.O.:1440; I.V.:100 (0.7 mL/kg); IV Piggyback:100]  Out: 1550 (11.5 mL/kg) [Urine:1550 (0.3 mL/kg/hr)]  Weight: 134.4 kg     Relevant Results  Results for orders placed or performed during the hospital encounter of 08/24/24 (from the past 24 hour(s))   Troponin I, High Sensitivity   Result Value Ref Range    Troponin I, High Sensitivity 4 0 - 20 ng/L   TSH   Result Value Ref Range    Thyroid Stimulating Hormone 0.39 (L) 0.44 - 3.98 mIU/L   POCT GLUCOSE   Result Value Ref Range    POCT Glucose 135 (H) 74 - 99 mg/dL       Imaging Results  Ct head:  IMPRESSION:  No acute intracranial bleed or focal mass effect.      Mild-to-moderate volume loss.    Ct cervical spine:  IMPRESSION:      1. No acute bony abnormalities  2. Images are degraded by patient motion artifact.  3. Cervical spondylosis noted. Previous anterior cervical spine  fusion appears  unremarkable.    Medications:  aspirin, 81 mg, oral, Daily  atorvastatin, 40 mg, oral, Daily  carvedilol, 3.125 mg, oral, BID  donepezil, 10 mg, oral, Nightly  DULoxetine, 30 mg, oral, Daily  fluticasone furoate-vilanteroL, 1 puff, inhalation, Daily  lamoTRIgine, 150 mg, oral, BID  levETIRAcetam, 1,500 mg, intravenous, q12h  mirtazapine, 7.5 mg, oral, Nightly  pantoprazole, 40 mg, oral, Daily before breakfast  QUEtiapine, 25 mg, oral, Nightly  remdesivir, 100 mg, intravenous, q24h  rivaroxaban, 20 mg, oral, Daily with evening meal  rOPINIRole, 8 mg, oral, Nightly  sertraline, 150 mg, oral, Nightly  venlafaxine XR, 150 mg, oral, Daily  zonisamide, 100 mg, oral, Daily       PRN medications: acetaminophen **OR** acetaminophen **OR** acetaminophen, LORazepam     Assessment/Plan     #Seizure with post ictal state  #encephalopathy resolving   -continue iv keppra, needs routine eeg       #sinus bradycardia  - patient has Medtronic leadless pacemaker      COVID 19 without hypoxia  - asymptomatic       DVT Prophylaxis:  Edgardo Shelby MD  [facility] Medicine

## 2024-08-28 NOTE — PROGRESS NOTES
Spiritual Care Visit    Clinical Encounter Type  Visited With: Patient  Routine Visit: Introduction  Continue Visiting: Yes  Crisis Visit:  (Responding to Rapid Response)  Referral From: Verbal  Referral To:     Other health caregiver(s) were attending to the pt.  There were no family members present at the time of the visit.  There will be another visit scheduled.    Chaplain Trina Santiago

## 2024-08-29 LAB
GLUCOSE BLD MANUAL STRIP-MCNC: 99 MG/DL (ref 74–99)
T4 FREE SERPL-MCNC: 0.78 NG/DL (ref 0.61–1.12)

## 2024-08-29 PROCEDURE — 1200000002 HC GENERAL ROOM WITH TELEMETRY DAILY

## 2024-08-29 PROCEDURE — 99233 SBSQ HOSP IP/OBS HIGH 50: CPT | Performed by: INTERNAL MEDICINE

## 2024-08-29 PROCEDURE — 36415 COLL VENOUS BLD VENIPUNCTURE: CPT | Performed by: INTERNAL MEDICINE

## 2024-08-29 PROCEDURE — 2500000001 HC RX 250 WO HCPCS SELF ADMINISTERED DRUGS (ALT 637 FOR MEDICARE OP): Performed by: INTERNAL MEDICINE

## 2024-08-29 PROCEDURE — 2500000004 HC RX 250 GENERAL PHARMACY W/ HCPCS (ALT 636 FOR OP/ED): Performed by: HOSPITALIST

## 2024-08-29 PROCEDURE — 2500000001 HC RX 250 WO HCPCS SELF ADMINISTERED DRUGS (ALT 637 FOR MEDICARE OP): Performed by: HOSPITALIST

## 2024-08-29 PROCEDURE — 2500000004 HC RX 250 GENERAL PHARMACY W/ HCPCS (ALT 636 FOR OP/ED): Performed by: PSYCHIATRY & NEUROLOGY

## 2024-08-29 PROCEDURE — 82947 ASSAY GLUCOSE BLOOD QUANT: CPT

## 2024-08-29 PROCEDURE — 2500000002 HC RX 250 W HCPCS SELF ADMINISTERED DRUGS (ALT 637 FOR MEDICARE OP, ALT 636 FOR OP/ED): Performed by: INTERNAL MEDICINE

## 2024-08-29 PROCEDURE — 2500000004 HC RX 250 GENERAL PHARMACY W/ HCPCS (ALT 636 FOR OP/ED): Performed by: INTERNAL MEDICINE

## 2024-08-29 PROCEDURE — 84439 ASSAY OF FREE THYROXINE: CPT | Performed by: INTERNAL MEDICINE

## 2024-08-29 RX ORDER — LORAZEPAM 2 MG/ML
2 INJECTION INTRAMUSCULAR ONCE
Status: DISCONTINUED | OUTPATIENT
Start: 2024-08-29 | End: 2024-09-02 | Stop reason: HOSPADM

## 2024-08-29 RX ORDER — NYSTATIN 100000 [USP'U]/G
1 POWDER TOPICAL 2 TIMES DAILY
Status: DISCONTINUED | OUTPATIENT
Start: 2024-08-29 | End: 2024-09-02 | Stop reason: HOSPADM

## 2024-08-29 RX ADMIN — ATORVASTATIN CALCIUM 40 MG: 40 TABLET, FILM COATED ORAL at 08:50

## 2024-08-29 RX ADMIN — SODIUM CHLORIDE 250 ML: 9 INJECTION, SOLUTION INTRAVENOUS at 01:01

## 2024-08-29 RX ADMIN — LEVETIRACETAM 1500 MG: 15 INJECTION INTRAVENOUS at 17:10

## 2024-08-29 RX ADMIN — DONEPEZIL HYDROCHLORIDE 10 MG: 5 TABLET ORAL at 20:37

## 2024-08-29 RX ADMIN — ZONISAMIDE 100 MG: 100 CAPSULE ORAL at 08:51

## 2024-08-29 RX ADMIN — LORAZEPAM 2 MG: 2 INJECTION INTRAMUSCULAR; INTRAVENOUS at 09:14

## 2024-08-29 RX ADMIN — QUETIAPINE FUMARATE 25 MG: 25 TABLET ORAL at 20:37

## 2024-08-29 RX ADMIN — CARVEDILOL 3.12 MG: 3.12 TABLET, FILM COATED ORAL at 08:51

## 2024-08-29 RX ADMIN — SERTRALINE 150 MG: 50 TABLET, FILM COATED ORAL at 20:36

## 2024-08-29 RX ADMIN — DULOXETINE HYDROCHLORIDE 30 MG: 30 CAPSULE, DELAYED RELEASE ORAL at 08:50

## 2024-08-29 RX ADMIN — RIVAROXABAN 20 MG: 20 TABLET, FILM COATED ORAL at 17:10

## 2024-08-29 RX ADMIN — LAMOTRIGINE 150 MG: 100 TABLET ORAL at 20:36

## 2024-08-29 RX ADMIN — LEVETIRACETAM 1500 MG: 15 INJECTION INTRAVENOUS at 05:39

## 2024-08-29 RX ADMIN — ROPINIROLE HYDROCHLORIDE 8 MG: 1 TABLET, FILM COATED ORAL at 20:37

## 2024-08-29 RX ADMIN — ASPIRIN 81 MG: 81 TABLET, COATED ORAL at 08:51

## 2024-08-29 RX ADMIN — LAMOTRIGINE 150 MG: 100 TABLET ORAL at 08:50

## 2024-08-29 RX ADMIN — NYSTATIN 1 APPLICATION: 100000 POWDER TOPICAL at 20:40

## 2024-08-29 ASSESSMENT — COGNITIVE AND FUNCTIONAL STATUS - GENERAL
MOBILITY SCORE: 13
HELP NEEDED FOR BATHING: TOTAL
TURNING FROM BACK TO SIDE WHILE IN FLAT BAD: A LITTLE
CLIMB 3 TO 5 STEPS WITH RAILING: TOTAL
WALKING IN HOSPITAL ROOM: A LOT
DRESSING REGULAR LOWER BODY CLOTHING: A LOT
MOVING TO AND FROM BED TO CHAIR: A LOT
TOILETING: A LOT
MOVING FROM LYING ON BACK TO SITTING ON SIDE OF FLAT BED WITH BEDRAILS: A LITTLE
DAILY ACTIVITIY SCORE: 15
PERSONAL GROOMING: A LITTLE
DRESSING REGULAR UPPER BODY CLOTHING: A LITTLE
STANDING UP FROM CHAIR USING ARMS: A LOT

## 2024-08-29 ASSESSMENT — PAIN SCALES - GENERAL: PAINLEVEL_OUTOF10: 0 - NO PAIN

## 2024-08-29 NOTE — CARE PLAN
The patient's goals for the shift include      The clinical goals for the shift include remain neurologically stable    Over the shift, the patient did not make progress toward the following goals. Barriers to progression include   Problem: Fall/Injury  Goal: Not fall by end of shift  Outcome: Progressing  Goal: Be free from injury by end of the shift  Outcome: Progressing     Problem: Skin  Goal: Prevent/manage excess moisture  Outcome: Progressing  Flowsheets (Taken 8/29/2024 1109)  Prevent/manage excess moisture: Cleanse incontinence/protect with barrier cream  Goal: Promote/optimize nutrition  Outcome: Progressing   . Recommendations to address these barriers include .

## 2024-08-29 NOTE — PROGRESS NOTES
"Shamir Buenrostro is a 69 y.o. male on day 5 of admission presenting with Seizure (Multi).    Subjective   Had another staring episode this am given 2 mg iv ativan x 1, hold mirtazipine and effoxor-XR       Objective     Physical Exam  Vitals reviewed.   Constitutional:       Appearance: Normal appearance.   HENT:      Head: Normocephalic.      Nose: Nose normal.      Mouth/Throat:      Mouth: Mucous membranes are moist.   Cardiovascular:      Rate and Rhythm: Normal rate and regular rhythm.   Pulmonary:      Effort: Pulmonary effort is normal.   Abdominal:      General: Abdomen is flat. Bowel sounds are normal.      Palpations: Abdomen is soft.   Skin:     Capillary Refill: Capillary refill takes less than 2 seconds.   Neurological:      General: No focal deficit present.      Mental Status: He is alert.         Last Recorded Vitals  Blood pressure 132/82, pulse 56, temperature 36.4 °C (97.5 °F), temperature source Temporal, resp. rate 16, height 1.753 m (5' 9.02\"), weight 134 kg (296 lb 4.8 oz), SpO2 98%.  Intake/Output last 3 Shifts:  I/O last 3 completed shifts:  In: 440 (3.3 mL/kg) [P.O.:240; I.V.:100 (0.7 mL/kg); IV Piggyback:100]  Out: 2851 (21.2 mL/kg) [Urine:2850 (0.6 mL/kg/hr); Stool:1]  Weight: 134.4 kg     Relevant Results  Results for orders placed or performed during the hospital encounter of 08/24/24 (from the past 24 hour(s))   T4, free   Result Value Ref Range    Thyroxine, Free 0.78 0.61 - 1.12 ng/dL       Imaging Results  Ct head:  IMPRESSION:  No acute intracranial bleed or focal mass effect.      Mild-to-moderate volume loss.    Ct cervical spine:  IMPRESSION:      1. No acute bony abnormalities  2. Images are degraded by patient motion artifact.  3. Cervical spondylosis noted. Previous anterior cervical spine  fusion appears unremarkable.    Medications:  aspirin, 81 mg, oral, Daily  atorvastatin, 40 mg, oral, Daily  carvedilol, 3.125 mg, oral, BID  donepezil, 10 mg, oral, Nightly  DULoxetine, 30 " mg, oral, Daily  fluticasone furoate-vilanteroL, 1 puff, inhalation, Daily  lamoTRIgine, 150 mg, oral, BID  levETIRAcetam, 1,500 mg, intravenous, q12h  LORazepam, 2 mg, intravenous, Once  [Held by provider] mirtazapine, 7.5 mg, oral, Nightly  pantoprazole, 40 mg, oral, Daily before breakfast  QUEtiapine, 25 mg, oral, Nightly  rivaroxaban, 20 mg, oral, Daily with evening meal  rOPINIRole, 8 mg, oral, Nightly  sertraline, 150 mg, oral, Nightly  [Held by provider] venlafaxine XR, 150 mg, oral, Daily  zonisamide, 100 mg, oral, Daily       PRN medications: acetaminophen **OR** acetaminophen **OR** acetaminophen, LORazepam     Assessment/Plan     #Seizure with post ictal state  #encephalopathy resolving   -continue iv keppra was increased to 1500mg po BID   -Neurology recommended to holding Remeron and Effexor since it decreases seizure threshold, patient is not postictal he is alert and oriented x 3  -EEG does not show epileptiform activity       #sinus bradycardia  - patient has Medtronic leadless pacemaker      COVID 19 without hypoxia  - asymptomatic       DVT Prophylaxis:  Edgardo Shelby MD  [facility] Medicine

## 2024-08-29 NOTE — SIGNIFICANT EVENT
Noted by nursing to have another staring spell this morning apparently lasting approximately 5 minutes.  I recommended 2 mg of lorazepam IV push.    Keppra was recently increased to 1500 mg every 12 hours.    EEG came back consistent with moderate diffuse encephalopathy (slowing) without epileptiform features or electrographic seizures.    I note that he has been on antidepressant polypharmacy including duloxetine, venlafaxine, sertraline, and mirtazapine.  Among these agents, venlafaxine and mirtazapine are the more likely to lower the seizure threshold.  I have recommended holding venlafaxine and mirtazapine.

## 2024-08-30 ENCOUNTER — APPOINTMENT (OUTPATIENT)
Dept: NEUROLOGY | Facility: HOSPITAL | Age: 69
DRG: 100 | End: 2024-08-30
Payer: MEDICARE

## 2024-08-30 ENCOUNTER — APPOINTMENT (OUTPATIENT)
Dept: CARDIOLOGY | Facility: HOSPITAL | Age: 69
DRG: 100 | End: 2024-08-30
Payer: MEDICARE

## 2024-08-30 LAB
ATRIAL RATE: 49 BPM
ATRIAL RATE: 54 BPM
P AXIS: 22 DEGREES
P AXIS: 22 DEGREES
P OFFSET: 164 MS
P OFFSET: 171 MS
P ONSET: 103 MS
P ONSET: 110 MS
PR INTERVAL: 202 MS
PR INTERVAL: 216 MS
Q ONSET: 211 MS
Q ONSET: 211 MS
QRS COUNT: 8 BEATS
QRS COUNT: 9 BEATS
QRS DURATION: 104 MS
QRS DURATION: 92 MS
QT INTERVAL: 474 MS
QT INTERVAL: 488 MS
QTC CALCULATION(BAZETT): 440 MS
QTC CALCULATION(BAZETT): 449 MS
QTC FREDERICIA: 456 MS
QTC FREDERICIA: 457 MS
R AXIS: 16 DEGREES
R AXIS: 8 DEGREES
T AXIS: 11 DEGREES
T AXIS: 20 DEGREES
T OFFSET: 448 MS
T OFFSET: 455 MS
VENTRICULAR RATE: 49 BPM
VENTRICULAR RATE: 54 BPM

## 2024-08-30 PROCEDURE — 93010 ELECTROCARDIOGRAM REPORT: CPT | Performed by: INTERNAL MEDICINE

## 2024-08-30 PROCEDURE — 2500000004 HC RX 250 GENERAL PHARMACY W/ HCPCS (ALT 636 FOR OP/ED): Performed by: INTERNAL MEDICINE

## 2024-08-30 PROCEDURE — 2500000001 HC RX 250 WO HCPCS SELF ADMINISTERED DRUGS (ALT 637 FOR MEDICARE OP): Performed by: HOSPITALIST

## 2024-08-30 PROCEDURE — 2500000002 HC RX 250 W HCPCS SELF ADMINISTERED DRUGS (ALT 637 FOR MEDICARE OP, ALT 636 FOR OP/ED): Performed by: INTERNAL MEDICINE

## 2024-08-30 PROCEDURE — 99233 SBSQ HOSP IP/OBS HIGH 50: CPT | Performed by: INTERNAL MEDICINE

## 2024-08-30 PROCEDURE — 2500000001 HC RX 250 WO HCPCS SELF ADMINISTERED DRUGS (ALT 637 FOR MEDICARE OP): Performed by: INTERNAL MEDICINE

## 2024-08-30 PROCEDURE — 95720 EEG PHY/QHP EA INCR W/VEEG: CPT | Performed by: PSYCHIATRY & NEUROLOGY

## 2024-08-30 PROCEDURE — 94640 AIRWAY INHALATION TREATMENT: CPT

## 2024-08-30 PROCEDURE — 93005 ELECTROCARDIOGRAM TRACING: CPT

## 2024-08-30 PROCEDURE — 1200000002 HC GENERAL ROOM WITH TELEMETRY DAILY

## 2024-08-30 PROCEDURE — 95700 EEG CONT REC W/VID EEG TECH: CPT

## 2024-08-30 PROCEDURE — 99231 SBSQ HOSP IP/OBS SF/LOW 25: CPT | Performed by: PSYCHIATRY & NEUROLOGY

## 2024-08-30 PROCEDURE — 2500000004 HC RX 250 GENERAL PHARMACY W/ HCPCS (ALT 636 FOR OP/ED): Performed by: PSYCHIATRY & NEUROLOGY

## 2024-08-30 RX ADMIN — ATORVASTATIN CALCIUM 40 MG: 40 TABLET, FILM COATED ORAL at 09:28

## 2024-08-30 RX ADMIN — DONEPEZIL HYDROCHLORIDE 10 MG: 5 TABLET ORAL at 21:34

## 2024-08-30 RX ADMIN — PANTOPRAZOLE SODIUM 40 MG: 40 TABLET, DELAYED RELEASE ORAL at 06:23

## 2024-08-30 RX ADMIN — LEVETIRACETAM 1500 MG: 15 INJECTION INTRAVENOUS at 06:23

## 2024-08-30 RX ADMIN — DULOXETINE HYDROCHLORIDE 30 MG: 30 CAPSULE, DELAYED RELEASE ORAL at 09:28

## 2024-08-30 RX ADMIN — LORAZEPAM 2 MG: 2 INJECTION INTRAMUSCULAR; INTRAVENOUS at 09:02

## 2024-08-30 RX ADMIN — ZONISAMIDE 100 MG: 100 CAPSULE ORAL at 09:29

## 2024-08-30 RX ADMIN — ROPINIROLE HYDROCHLORIDE 8 MG: 1 TABLET, FILM COATED ORAL at 21:34

## 2024-08-30 RX ADMIN — SERTRALINE 150 MG: 50 TABLET, FILM COATED ORAL at 21:34

## 2024-08-30 RX ADMIN — NYSTATIN 1 APPLICATION: 100000 POWDER TOPICAL at 21:35

## 2024-08-30 RX ADMIN — LAMOTRIGINE 150 MG: 100 TABLET ORAL at 21:35

## 2024-08-30 RX ADMIN — FLUTICASONE FUROATE AND VILANTEROL TRIFENATATE 1 PUFF: 200; 25 POWDER RESPIRATORY (INHALATION) at 10:55

## 2024-08-30 RX ADMIN — CARVEDILOL 3.12 MG: 3.12 TABLET, FILM COATED ORAL at 21:35

## 2024-08-30 RX ADMIN — RIVAROXABAN 20 MG: 20 TABLET, FILM COATED ORAL at 17:03

## 2024-08-30 RX ADMIN — LEVETIRACETAM 1500 MG: 15 INJECTION INTRAVENOUS at 17:03

## 2024-08-30 RX ADMIN — LAMOTRIGINE 150 MG: 100 TABLET ORAL at 09:28

## 2024-08-30 RX ADMIN — QUETIAPINE FUMARATE 25 MG: 25 TABLET ORAL at 21:35

## 2024-08-30 RX ADMIN — NYSTATIN 1 APPLICATION: 100000 POWDER TOPICAL at 09:34

## 2024-08-30 RX ADMIN — ASPIRIN 81 MG: 81 TABLET, COATED ORAL at 09:29

## 2024-08-30 ASSESSMENT — PAIN SCALES - GENERAL
PAINLEVEL_OUTOF10: 0 - NO PAIN
PAINLEVEL_OUTOF10: 6

## 2024-08-30 ASSESSMENT — COGNITIVE AND FUNCTIONAL STATUS - GENERAL
TOILETING: A LOT
MOBILITY SCORE: 13
PERSONAL GROOMING: A LITTLE
MOVING TO AND FROM BED TO CHAIR: A LOT
MOBILITY SCORE: 9
WALKING IN HOSPITAL ROOM: TOTAL
WALKING IN HOSPITAL ROOM: TOTAL
TOILETING: A LOT
HELP NEEDED FOR BATHING: TOTAL
DRESSING REGULAR LOWER BODY CLOTHING: A LOT
DRESSING REGULAR UPPER BODY CLOTHING: A LITTLE
MOVING TO AND FROM BED TO CHAIR: A LOT
DRESSING REGULAR UPPER BODY CLOTHING: A LOT
CLIMB 3 TO 5 STEPS WITH RAILING: TOTAL
TOILETING: A LOT
MOVING FROM LYING ON BACK TO SITTING ON SIDE OF FLAT BED WITH BEDRAILS: A LOT
MOVING FROM LYING ON BACK TO SITTING ON SIDE OF FLAT BED WITH BEDRAILS: A LOT
DAILY ACTIVITIY SCORE: 14
MOVING TO AND FROM BED TO CHAIR: A LOT
EATING MEALS: A LITTLE
STANDING UP FROM CHAIR USING ARMS: TOTAL
MOBILITY SCORE: 9
DAILY ACTIVITIY SCORE: 12
DRESSING REGULAR UPPER BODY CLOTHING: A LOT
STANDING UP FROM CHAIR USING ARMS: TOTAL
CLIMB 3 TO 5 STEPS WITH RAILING: TOTAL
MOVING FROM LYING ON BACK TO SITTING ON SIDE OF FLAT BED WITH BEDRAILS: A LITTLE
DRESSING REGULAR LOWER BODY CLOTHING: A LOT
PERSONAL GROOMING: A LOT
HELP NEEDED FOR BATHING: TOTAL
WALKING IN HOSPITAL ROOM: A LOT
DRESSING REGULAR LOWER BODY CLOTHING: A LOT
TURNING FROM BACK TO SIDE WHILE IN FLAT BAD: A LOT
PERSONAL GROOMING: A LITTLE
TURNING FROM BACK TO SIDE WHILE IN FLAT BAD: A LITTLE
CLIMB 3 TO 5 STEPS WITH RAILING: TOTAL
DAILY ACTIVITIY SCORE: 15
HELP NEEDED FOR BATHING: TOTAL
TURNING FROM BACK TO SIDE WHILE IN FLAT BAD: A LOT
STANDING UP FROM CHAIR USING ARMS: A LOT

## 2024-08-30 ASSESSMENT — PAIN - FUNCTIONAL ASSESSMENT: PAIN_FUNCTIONAL_ASSESSMENT: 0-10

## 2024-08-30 NOTE — PROGRESS NOTES
"Shamir Buenrostro is a 69 y.o. male on day 6 of admission presenting with Seizure (Multi).    Subjective   2 absence seizure's, will be started on continuous EEG this morning.  When I saw the patient he was alert and oriented x 3.  He did have heart rates going into the 40s and 50s will need to interrogate his pacemaker       Objective     Physical Exam  Vitals reviewed.   Constitutional:       Appearance: Normal appearance.   HENT:      Head: Normocephalic.      Nose: Nose normal.      Mouth/Throat:      Mouth: Mucous membranes are moist.   Cardiovascular:      Rate and Rhythm: Normal rate and regular rhythm.   Pulmonary:      Effort: Pulmonary effort is normal.   Abdominal:      General: Abdomen is flat. Bowel sounds are normal.      Palpations: Abdomen is soft.   Skin:     Capillary Refill: Capillary refill takes less than 2 seconds.   Neurological:      General: No focal deficit present.      Mental Status: He is alert.         Last Recorded Vitals  Blood pressure 137/71, pulse 53, temperature 36.8 °C (98.2 °F), temperature source Temporal, resp. rate 18, height 1.753 m (5' 9.02\"), weight 134 kg (296 lb 4.8 oz), SpO2 97%.  Intake/Output last 3 Shifts:  I/O last 3 completed shifts:  In: 550 (4.1 mL/kg) [IV Piggyback:550]  Out: 1101 (8.2 mL/kg) [Urine:1100 (0.2 mL/kg/hr); Stool:1]  Weight: 134.4 kg     Relevant Results  Results for orders placed or performed during the hospital encounter of 08/24/24 (from the past 24 hour(s))   POCT GLUCOSE   Result Value Ref Range    POCT Glucose 99 74 - 99 mg/dL       Imaging Results  Ct head:  IMPRESSION:  No acute intracranial bleed or focal mass effect.      Mild-to-moderate volume loss.    Ct cervical spine:  IMPRESSION:      1. No acute bony abnormalities  2. Images are degraded by patient motion artifact.  3. Cervical spondylosis noted. Previous anterior cervical spine  fusion appears unremarkable.    Medications:  aspirin, 81 mg, oral, Daily  atorvastatin, 40 mg, oral, " Daily  carvedilol, 3.125 mg, oral, BID  donepezil, 10 mg, oral, Nightly  DULoxetine, 30 mg, oral, Daily  fluticasone furoate-vilanteroL, 1 puff, inhalation, Daily  lamoTRIgine, 150 mg, oral, BID  levETIRAcetam, 1,500 mg, intravenous, q12h  LORazepam, 2 mg, intravenous, Once  [Held by provider] mirtazapine, 7.5 mg, oral, Nightly  nystatin, 1 Application, Topical, BID  pantoprazole, 40 mg, oral, Daily before breakfast  QUEtiapine, 25 mg, oral, Nightly  rivaroxaban, 20 mg, oral, Daily with evening meal  rOPINIRole, 8 mg, oral, Nightly  sertraline, 150 mg, oral, Nightly  [Held by provider] venlafaxine XR, 150 mg, oral, Daily  zonisamide, 100 mg, oral, Daily       PRN medications: acetaminophen **OR** acetaminophen **OR** acetaminophen, LORazepam     Assessment/Plan     #Seizure with post ictal state  #encephalopathy resolving   -continue iv keppra was increased to 1500mg po BID   -Neurology recommended to holding Remeron and Effexor since it decreases seizure threshold, patient is not postictal he is alert and oriented x 3  -EEG does not show epileptiform activity  -to start on continuous eeg today  -recurrent episodes of ? Absence seizures       #sinus bradycardia  - patient has Medtronic leadless pacemaker   -will interrogate     COVID 19 without hypoxia  - asymptomatic       DVT Prophylaxis:  Edgardo Shelby MD  [facility]

## 2024-08-30 NOTE — SIGNIFICANT EVENT
"Preliminary/Baseline EEG Report:    - This vEEG is consistent with mild diffuse encephalopathy. No epileptiform activity is recorded in this baseline file.          This EEG was read until 12:40 PM on 08/30/24 .     - Please press the SCOTT button on the EEG machine if patient has a staring spell     The final impression will be available tomorrow under Chart Review in the Media tab.     If the plan is to discontinue the video EEG, please place a \"Discontinue Continuous VEEG\" order in the EMR; otherwise the EEG tech will not receive the notification.     Santos Saavedra,   Adult Epilepsy Fellow  -[facility] [place]     "

## 2024-08-30 NOTE — PROGRESS NOTES
08/30/24 0912   Discharge Planning   Living Arrangements Spouse/significant other   Support Systems Family members  (visitng brother ; patient lives in [place])   Assistance Needed per IM chart review;encephalopathy resolving   -continue iv keppra was increased to 1500mg po BID Neurology following; seizure meds adjusted; OOB encouraged,discussed  with patient is wheelchair bound   Expected Discharge Disposition Home   Does the patient need discharge transport arranged? Yes   RoundTrip coordination needed? Yes   Has discharge transport been arranged? No

## 2024-08-30 NOTE — ASSESSMENT & PLAN NOTE
Recurrent staring spells that may or may not be an epileptic phenomenon.  It appears that there has not been anything concerning on continuous EEG thus far, albeit only instituted earlier today.    I added zonisamide for seizure control.  Continue current regimen of levetiracetam and lamotrigine.    If continuous EEG is negative over 24 hours, it can be discontinued.

## 2024-08-30 NOTE — PROGRESS NOTES
"Shamir Buenrostro is a 69 y.o. male on day 6 of admission presenting with Seizure (Multi).      Subjective   He had another episode resembling an absence seizure at roughly 9 AM today, for which given lorazepam with apparent response.  However, has continued having such episodes intermittently.  Accordingly, he has been placed on continuous EEG.    He is without complaint at present, alert and conversant.  Asks when he is going home and asks if he can drive.  He remains in a COVID isolation room.      Continuous EEG is running and on my review of a few epochs at bedside shows no notable features and in particular no epileptiform activity or electrographic seizures.  EMG artifact in the right temporal derivations.       Objective     Last Recorded Vitals  Blood pressure 98/57, pulse 55, temperature 37 °C (98.6 °F), temperature source Temporal, resp. rate 16, height 1.753 m (5' 9.02\"), weight 134 kg (296 lb 4.8 oz), SpO2 96%.    Physical Exam  Neurological Exam    Sitting up in bed, alert and conversant, with appropriate eye contact.  No staring spells during my visit.  Continuous EEG running.  Oriented to self, month, year, not exact date.  Gives the status [place].  Fluent language without paraphasic errors.  No gaze deviation or ptosis, facial motor asymmetry or dysarthria.  No myoclonus/asterixis.      Relevant Results                        Edmond Coma Scale  Best Eye Response: Spontaneous  Best Verbal Response: Oriented  Best Motor Response: Follows commands  Edmond Coma Scale Score: 15                             Assessment/Plan      Assessment & Plan  Seizure (Multi)    Recurrent staring spells that may or may not be an epileptic phenomenon.  It appears that there has not been anything concerning on continuous EEG thus far, albeit only instituted earlier today.    I added zonisamide for seizure control.  Continue current regimen of levetiracetam and lamotrigine.    If continuous EEG is negative over 24 hours, " it can be discontinued.                   Barber Cedeño MD

## 2024-08-30 NOTE — CARE PLAN
The patient's goals for the shift include determining cause of seizures.     The clinical goals for the shift include pt will maintain safety  throught the shift      Problem: Fall/Injury  Goal: Not fall by end of shift  Outcome: Progressing  Goal: Be free from injury by end of the shift  Outcome: Progressing  Goal: Verbalize understanding of personal risk factors for fall in the hospital  Outcome: Progressing  Goal: Verbalize understanding of risk factor reduction measures to prevent injury from fall in the home  Outcome: Progressing  Goal: Use assistive devices by end of the shift  Outcome: Progressing  Goal: Pace activities to prevent fatigue by end of the shift  Outcome: Progressing     Problem: Skin  Goal: Decreased wound size/increased tissue granulation at next dressing change  Outcome: Progressing  Flowsheets (Taken 8/30/2024 1425)  Decreased wound size/increased tissue granulation at next dressing change: Promote sleep for wound healing  Goal: Participates in plan/prevention/treatment measures  Outcome: Progressing  Flowsheets (Taken 8/30/2024 1425)  Participates in plan/prevention/treatment measures: Elevate heels  Goal: Prevent/manage excess moisture  Outcome: Progressing  Flowsheets (Taken 8/30/2024 1425)  Prevent/manage excess moisture: Monitor for/manage infection if present  Goal: Prevent/minimize sheer/friction injuries  Outcome: Progressing  Flowsheets (Taken 8/30/2024 1425)  Prevent/minimize sheer/friction injuries: HOB 30 degrees or less  Goal: Promote/optimize nutrition  Outcome: Progressing  Flowsheets (Taken 8/30/2024 1425)  Promote/optimize nutrition: Consume > 50% meals/supplements  Goal: Promote skin healing  Outcome: Progressing  Flowsheets (Taken 8/30/2024 1425)  Promote skin healing: Assess skin/pad under line(s)/device(s)     Problem: Pain - Adult  Goal: Verbalizes/displays adequate comfort level or baseline comfort level  Outcome: Progressing     Problem: Safety - Adult  Goal: Free from  fall injury  Outcome: Progressing     Problem: Discharge Planning  Goal: Discharge to home or other facility with appropriate resources  Outcome: Progressing     Problem: Chronic Conditions and Co-morbidities  Goal: Patient's chronic conditions and co-morbidity symptoms are monitored and maintained or improved  Outcome: Progressing     Problem: Infective Meningitis/Encephalitis  Goal: Ansence or control of seizures  Outcome: Progressing     Problem: Seizures  Goal: Absence or minimized seizure activity  Outcome: Progressing  Goal: Freedom from injury  Outcome: Progressing  Goal: Intact skin surrounding leads  Outcome: Progressing  Goal: No signs of respiratory or cardiac compromise  Outcome: Progressing  Goal: Protection of airway  Outcome: Progressing

## 2024-08-30 NOTE — CARE PLAN
The patient's goals for the shift include      The clinical goals for the shift include remain hds    Problem: Fall/Injury  Goal: Not fall by end of shift  Outcome: Progressing  Goal: Be free from injury by end of the shift  Outcome: Progressing  Goal: Verbalize understanding of personal risk factors for fall in the hospital  Outcome: Progressing  Goal: Verbalize understanding of risk factor reduction measures to prevent injury from fall in the home  Outcome: Progressing  Goal: Use assistive devices by end of the shift  Outcome: Progressing  Goal: Pace activities to prevent fatigue by end of the shift  Outcome: Progressing     Problem: Skin  Goal: Prevent/manage excess moisture  Outcome: Progressing  Flowsheets (Taken 8/30/2024 0142)  Prevent/manage excess moisture: Moisturize dry skin  Goal: Prevent/minimize sheer/friction injuries  Outcome: Progressing     Problem: Pain - Adult  Goal: Verbalizes/displays adequate comfort level or baseline comfort level  Outcome: Progressing     Problem: Safety - Adult  Goal: Free from fall injury  Outcome: Progressing

## 2024-08-31 PROCEDURE — 1200000002 HC GENERAL ROOM WITH TELEMETRY DAILY

## 2024-08-31 PROCEDURE — 2500000002 HC RX 250 W HCPCS SELF ADMINISTERED DRUGS (ALT 637 FOR MEDICARE OP, ALT 636 FOR OP/ED): Performed by: INTERNAL MEDICINE

## 2024-08-31 PROCEDURE — 2500000001 HC RX 250 WO HCPCS SELF ADMINISTERED DRUGS (ALT 637 FOR MEDICARE OP): Performed by: INTERNAL MEDICINE

## 2024-08-31 PROCEDURE — 2500000004 HC RX 250 GENERAL PHARMACY W/ HCPCS (ALT 636 FOR OP/ED): Performed by: PSYCHIATRY & NEUROLOGY

## 2024-08-31 PROCEDURE — 94640 AIRWAY INHALATION TREATMENT: CPT

## 2024-08-31 PROCEDURE — 2500000001 HC RX 250 WO HCPCS SELF ADMINISTERED DRUGS (ALT 637 FOR MEDICARE OP): Performed by: HOSPITALIST

## 2024-08-31 PROCEDURE — 99232 SBSQ HOSP IP/OBS MODERATE 35: CPT | Performed by: INTERNAL MEDICINE

## 2024-08-31 PROCEDURE — 2500000004 HC RX 250 GENERAL PHARMACY W/ HCPCS (ALT 636 FOR OP/ED): Performed by: INTERNAL MEDICINE

## 2024-08-31 RX ADMIN — DULOXETINE HYDROCHLORIDE 30 MG: 30 CAPSULE, DELAYED RELEASE ORAL at 08:39

## 2024-08-31 RX ADMIN — LEVETIRACETAM 1500 MG: 15 INJECTION INTRAVENOUS at 06:57

## 2024-08-31 RX ADMIN — LEVETIRACETAM 1500 MG: 15 INJECTION INTRAVENOUS at 17:30

## 2024-08-31 RX ADMIN — LORAZEPAM 2 MG: 2 INJECTION INTRAMUSCULAR; INTRAVENOUS at 15:49

## 2024-08-31 RX ADMIN — FLUTICASONE FUROATE AND VILANTEROL TRIFENATATE 1 PUFF: 200; 25 POWDER RESPIRATORY (INHALATION) at 11:49

## 2024-08-31 RX ADMIN — QUETIAPINE FUMARATE 25 MG: 25 TABLET ORAL at 22:33

## 2024-08-31 RX ADMIN — RIVAROXABAN 20 MG: 20 TABLET, FILM COATED ORAL at 17:31

## 2024-08-31 RX ADMIN — LAMOTRIGINE 150 MG: 100 TABLET ORAL at 22:33

## 2024-08-31 RX ADMIN — DONEPEZIL HYDROCHLORIDE 10 MG: 5 TABLET ORAL at 22:33

## 2024-08-31 RX ADMIN — PANTOPRAZOLE SODIUM 40 MG: 40 TABLET, DELAYED RELEASE ORAL at 06:57

## 2024-08-31 RX ADMIN — NYSTATIN 1 APPLICATION: 100000 POWDER TOPICAL at 08:58

## 2024-08-31 RX ADMIN — ATORVASTATIN CALCIUM 40 MG: 40 TABLET, FILM COATED ORAL at 08:39

## 2024-08-31 RX ADMIN — ROPINIROLE HYDROCHLORIDE 8 MG: 1 TABLET, FILM COATED ORAL at 22:33

## 2024-08-31 RX ADMIN — ACETAMINOPHEN 650 MG: 325 TABLET ORAL at 11:25

## 2024-08-31 RX ADMIN — LAMOTRIGINE 150 MG: 100 TABLET ORAL at 08:39

## 2024-08-31 RX ADMIN — SERTRALINE 150 MG: 50 TABLET, FILM COATED ORAL at 22:33

## 2024-08-31 RX ADMIN — NYSTATIN 1 APPLICATION: 100000 POWDER TOPICAL at 22:41

## 2024-08-31 RX ADMIN — ASPIRIN 81 MG: 81 TABLET, COATED ORAL at 08:40

## 2024-08-31 RX ADMIN — ZONISAMIDE 100 MG: 100 CAPSULE ORAL at 08:40

## 2024-08-31 ASSESSMENT — COGNITIVE AND FUNCTIONAL STATUS - GENERAL
TOILETING: A LOT
STANDING UP FROM CHAIR USING ARMS: A LOT
DAILY ACTIVITIY SCORE: 14
PERSONAL GROOMING: A LITTLE
WALKING IN HOSPITAL ROOM: A LOT
TURNING FROM BACK TO SIDE WHILE IN FLAT BAD: A LOT
MOBILITY SCORE: 13
HELP NEEDED FOR BATHING: A LOT
CLIMB 3 TO 5 STEPS WITH RAILING: A LOT
MOVING TO AND FROM BED TO CHAIR: A LOT
DRESSING REGULAR LOWER BODY CLOTHING: A LOT
MOVING FROM LYING ON BACK TO SITTING ON SIDE OF FLAT BED WITH BEDRAILS: A LITTLE
DRESSING REGULAR UPPER BODY CLOTHING: A LOT
EATING MEALS: A LITTLE

## 2024-08-31 ASSESSMENT — PAIN SCALES - GENERAL
PAINLEVEL_OUTOF10: 0 - NO PAIN
PAINLEVEL_OUTOF10: 3

## 2024-08-31 ASSESSMENT — PAIN DESCRIPTION - LOCATION: LOCATION: HEAD

## 2024-08-31 ASSESSMENT — PAIN - FUNCTIONAL ASSESSMENT: PAIN_FUNCTIONAL_ASSESSMENT: 0-10

## 2024-08-31 NOTE — NURSING NOTE
#22 placed in the LAC via ultrasound by Manuela after failed attempts of bedside and charge nurse. Patient tolerated well.   Also, patient was found around 1550 to be staring off into space and was not arousable with verbal stimuli. Was only able to blink after painful stimuli. 2mg IV ativan given. Dr. Cedeño aware, no need to call rapid response. Per MD could be behavioral component.

## 2024-08-31 NOTE — CARE PLAN
The patient's goals for the shift include  vss    The clinical goals for the shift include Patient will remain HDS    Over the shift, the patient did not make progress toward the following goals. Barriers to progression include n/a. Recommendations to address these barriers include n/a.

## 2024-08-31 NOTE — PROGRESS NOTES
"Shamir Buenrostro is a 69 y.o. male on day 7 of admission presenting with Seizure (Multi).    Subjective   Pacemaker to be interrogated patient was having staring spells today placed on continuous EEG for 24 hours, zonisamide was added.  Patient is doing well today.       Objective     Physical Exam  Vitals reviewed.   Constitutional:       Appearance: Normal appearance.   HENT:      Head: Normocephalic.      Nose: Nose normal.      Mouth/Throat:      Mouth: Mucous membranes are moist.   Cardiovascular:      Rate and Rhythm: Normal rate and regular rhythm.   Pulmonary:      Effort: Pulmonary effort is normal.   Abdominal:      General: Abdomen is flat. Bowel sounds are normal.      Palpations: Abdomen is soft.   Skin:     Capillary Refill: Capillary refill takes less than 2 seconds.   Neurological:      General: No focal deficit present.      Mental Status: He is alert.         Last Recorded Vitals  Blood pressure 147/78, pulse 55, temperature 36.4 °C (97.5 °F), temperature source Temporal, resp. rate 18, height 1.753 m (5' 9.02\"), weight 134 kg (296 lb 4.8 oz), SpO2 99%.  Intake/Output last 3 Shifts:  I/O last 3 completed shifts:  In: 580 (4.3 mL/kg) [P.O.:480; IV Piggyback:100]  Out: 1100 (8.2 mL/kg) [Urine:1100 (0.2 mL/kg/hr)]  Weight: 134.4 kg     Relevant Results  No results found for this or any previous visit (from the past 24 hour(s)).      Imaging Results  Ct head:  IMPRESSION:  No acute intracranial bleed or focal mass effect.      Mild-to-moderate volume loss.    Ct cervical spine:  IMPRESSION:      1. No acute bony abnormalities  2. Images are degraded by patient motion artifact.  3. Cervical spondylosis noted. Previous anterior cervical spine  fusion appears unremarkable.    Medications:  aspirin, 81 mg, oral, Daily  atorvastatin, 40 mg, oral, Daily  carvedilol, 3.125 mg, oral, BID  donepezil, 10 mg, oral, Nightly  DULoxetine, 30 mg, oral, Daily  fluticasone furoate-vilanteroL, 1 puff, inhalation, " Daily  lamoTRIgine, 150 mg, oral, BID  levETIRAcetam, 1,500 mg, intravenous, q12h  LORazepam, 2 mg, intravenous, Once  [Held by provider] mirtazapine, 7.5 mg, oral, Nightly  nystatin, 1 Application, Topical, BID  pantoprazole, 40 mg, oral, Daily before breakfast  QUEtiapine, 25 mg, oral, Nightly  rivaroxaban, 20 mg, oral, Daily with evening meal  rOPINIRole, 8 mg, oral, Nightly  sertraline, 150 mg, oral, Nightly  [Held by provider] venlafaxine XR, 150 mg, oral, Daily  zonisamide, 100 mg, oral, Daily       PRN medications: acetaminophen **OR** acetaminophen **OR** acetaminophen, LORazepam     Assessment/Plan     #Seizure with post ictal state  #encephalopathy resolving   -continue iv keppra was increased to 1500mg po BID   -Neurology recommended to holding Remeron and Effexor since it decreases seizure threshold, started on stenosis of mild on August 30, continuous EEG for 24 hours  -EEG does not show epileptiform activity         #sinus bradycardia  - patient has [employer]tronic leadless pacemaker   -will interrogate     COVID 19 without hypoxia  - asymptomatic       DVT Prophylaxis:  Edgardo Shelby MD  [facility]

## 2024-08-31 NOTE — SIGNIFICANT EVENT
Overnight video EEG has been read and showed mild to moderate diffuse encephalopathy and generally low amplitude, without epileptiform activity or electrographic seizures.    I am discontinuing video EEG.  His staring spells may have a nonepileptic basis.    Recommend continuing his current anticonvulsant regimen.    Recommend outpatient Epilepsy neurology evaluation after discharge.

## 2024-09-01 VITALS
DIASTOLIC BLOOD PRESSURE: 98 MMHG | RESPIRATION RATE: 16 BRPM | SYSTOLIC BLOOD PRESSURE: 172 MMHG | HEART RATE: 57 BPM | OXYGEN SATURATION: 98 % | TEMPERATURE: 97.9 F | HEIGHT: 69 IN | BODY MASS INDEX: 43.89 KG/M2 | WEIGHT: 296.3 LBS

## 2024-09-01 PROCEDURE — 2500000004 HC RX 250 GENERAL PHARMACY W/ HCPCS (ALT 636 FOR OP/ED): Performed by: HOSPITALIST

## 2024-09-01 PROCEDURE — 2500000001 HC RX 250 WO HCPCS SELF ADMINISTERED DRUGS (ALT 637 FOR MEDICARE OP): Performed by: INTERNAL MEDICINE

## 2024-09-01 PROCEDURE — 99239 HOSP IP/OBS DSCHRG MGMT >30: CPT | Performed by: INTERNAL MEDICINE

## 2024-09-01 PROCEDURE — 94640 AIRWAY INHALATION TREATMENT: CPT

## 2024-09-01 PROCEDURE — 2500000001 HC RX 250 WO HCPCS SELF ADMINISTERED DRUGS (ALT 637 FOR MEDICARE OP): Performed by: HOSPITALIST

## 2024-09-01 PROCEDURE — 97161 PT EVAL LOW COMPLEX 20 MIN: CPT | Mod: GP

## 2024-09-01 PROCEDURE — 1200000002 HC GENERAL ROOM WITH TELEMETRY DAILY

## 2024-09-01 PROCEDURE — 2500000004 HC RX 250 GENERAL PHARMACY W/ HCPCS (ALT 636 FOR OP/ED): Performed by: PSYCHIATRY & NEUROLOGY

## 2024-09-01 PROCEDURE — 2500000002 HC RX 250 W HCPCS SELF ADMINISTERED DRUGS (ALT 637 FOR MEDICARE OP, ALT 636 FOR OP/ED): Performed by: INTERNAL MEDICINE

## 2024-09-01 RX ORDER — LEVETIRACETAM 1000 MG/1
1500 TABLET ORAL 2 TIMES DAILY
Qty: 90 TABLET | Refills: 0 | Status: SHIPPED | OUTPATIENT
Start: 2024-09-01 | End: 2024-10-01

## 2024-09-01 RX ORDER — LAMOTRIGINE 150 MG/1
150 TABLET ORAL 2 TIMES DAILY
Qty: 60 TABLET | Refills: 0 | Status: SHIPPED | OUTPATIENT
Start: 2024-09-01 | End: 2024-10-01

## 2024-09-01 RX ADMIN — ZONISAMIDE 100 MG: 100 CAPSULE ORAL at 09:25

## 2024-09-01 RX ADMIN — RIVAROXABAN 20 MG: 20 TABLET, FILM COATED ORAL at 17:18

## 2024-09-01 RX ADMIN — DONEPEZIL HYDROCHLORIDE 10 MG: 5 TABLET ORAL at 20:31

## 2024-09-01 RX ADMIN — LEVETIRACETAM 1500 MG: 15 INJECTION INTRAVENOUS at 18:09

## 2024-09-01 RX ADMIN — CARVEDILOL 3.12 MG: 3.12 TABLET, FILM COATED ORAL at 09:25

## 2024-09-01 RX ADMIN — ATORVASTATIN CALCIUM 40 MG: 40 TABLET, FILM COATED ORAL at 09:25

## 2024-09-01 RX ADMIN — SODIUM CHLORIDE 500 ML: 9 INJECTION, SOLUTION INTRAVENOUS at 13:51

## 2024-09-01 RX ADMIN — LAMOTRIGINE 150 MG: 100 TABLET ORAL at 09:25

## 2024-09-01 RX ADMIN — FLUTICASONE FUROATE AND VILANTEROL TRIFENATATE 1 PUFF: 200; 25 POWDER RESPIRATORY (INHALATION) at 07:44

## 2024-09-01 RX ADMIN — ROPINIROLE HYDROCHLORIDE 8 MG: 1 TABLET, FILM COATED ORAL at 20:31

## 2024-09-01 RX ADMIN — SERTRALINE 150 MG: 50 TABLET, FILM COATED ORAL at 20:31

## 2024-09-01 RX ADMIN — DULOXETINE HYDROCHLORIDE 30 MG: 30 CAPSULE, DELAYED RELEASE ORAL at 09:25

## 2024-09-01 RX ADMIN — LEVETIRACETAM 1500 MG: 15 INJECTION INTRAVENOUS at 06:57

## 2024-09-01 RX ADMIN — PANTOPRAZOLE SODIUM 40 MG: 40 TABLET, DELAYED RELEASE ORAL at 09:25

## 2024-09-01 RX ADMIN — LAMOTRIGINE 150 MG: 100 TABLET ORAL at 20:31

## 2024-09-01 RX ADMIN — SODIUM CHLORIDE 500 ML: 9 INJECTION, SOLUTION INTRAVENOUS at 00:55

## 2024-09-01 RX ADMIN — QUETIAPINE FUMARATE 25 MG: 25 TABLET ORAL at 20:31

## 2024-09-01 RX ADMIN — ASPIRIN 81 MG: 81 TABLET, COATED ORAL at 09:25

## 2024-09-01 ASSESSMENT — COGNITIVE AND FUNCTIONAL STATUS - GENERAL
MOVING FROM LYING ON BACK TO SITTING ON SIDE OF FLAT BED WITH BEDRAILS: A LOT
PERSONAL GROOMING: A LITTLE
WALKING IN HOSPITAL ROOM: A LOT
STANDING UP FROM CHAIR USING ARMS: A LOT
DRESSING REGULAR LOWER BODY CLOTHING: A LOT
CLIMB 3 TO 5 STEPS WITH RAILING: A LOT
MOBILITY SCORE: 9
MOVING TO AND FROM BED TO CHAIR: A LOT
PERSONAL GROOMING: A LITTLE
TURNING FROM BACK TO SIDE WHILE IN FLAT BAD: A LOT
STANDING UP FROM CHAIR USING ARMS: A LOT
MOBILITY SCORE: 13
WALKING IN HOSPITAL ROOM: A LOT
MOVING FROM LYING ON BACK TO SITTING ON SIDE OF FLAT BED WITH BEDRAILS: A LITTLE
EATING MEALS: A LITTLE
TOILETING: A LOT
MOVING FROM LYING ON BACK TO SITTING ON SIDE OF FLAT BED WITH BEDRAILS: A LITTLE
HELP NEEDED FOR BATHING: A LOT
TURNING FROM BACK TO SIDE WHILE IN FLAT BAD: A LOT
TOILETING: A LOT
TURNING FROM BACK TO SIDE WHILE IN FLAT BAD: A LOT
CLIMB 3 TO 5 STEPS WITH RAILING: TOTAL
DRESSING REGULAR UPPER BODY CLOTHING: A LITTLE
DRESSING REGULAR LOWER BODY CLOTHING: A LOT
EATING MEALS: A LITTLE
MOVING TO AND FROM BED TO CHAIR: A LOT
STANDING UP FROM CHAIR USING ARMS: TOTAL
MOBILITY SCORE: 13
DRESSING REGULAR UPPER BODY CLOTHING: A LOT
MOVING TO AND FROM BED TO CHAIR: A LOT
WALKING IN HOSPITAL ROOM: TOTAL
DAILY ACTIVITIY SCORE: 14
DAILY ACTIVITIY SCORE: 15
HELP NEEDED FOR BATHING: A LOT
CLIMB 3 TO 5 STEPS WITH RAILING: A LOT

## 2024-09-01 ASSESSMENT — PAIN SCALES - GENERAL
PAINLEVEL_OUTOF10: 2
PAINLEVEL_OUTOF10: 0 - NO PAIN
PAINLEVEL_OUTOF10: 3
PAINLEVEL_OUTOF10: 0 - NO PAIN

## 2024-09-01 ASSESSMENT — PAIN DESCRIPTION - DESCRIPTORS: DESCRIPTORS: ACHING

## 2024-09-01 ASSESSMENT — PAIN - FUNCTIONAL ASSESSMENT: PAIN_FUNCTIONAL_ASSESSMENT: 0-10

## 2024-09-01 NOTE — NURSING NOTE
Rapid Response Nurse Note:      Shamir Buenrostro is a 69 y.o. male     Rapid response code guicho called at 13:45 for patient fall. Patient slid out of bed while attempting to get dressed prior to discharge. Four nurses used a gait belt to return the patient to the bed. Bedside RN was in room at time of event. 500cc bolus ordered. Patient discharge was held pending PT/OT consult. Reviewed patient chart and checked with bedside nurse for any questions or concerns, none voiced at this time.      Bedside nurse notified to escalate concerns if patient condition changes       Rosalie Barrios RN

## 2024-09-01 NOTE — SIGNIFICANT EVENT
[facility] Rapid Response Evaluation Note  [facility]      Shamir March    :  1955(69 y.o.)    MRN:  [id number]  Date: 24     Rapid response called for patient fall. He slid out of bed while trying to get dressed for discharge. He was unable to get up on his own. Needed assistance from 4 RN to get back in bed.  RN was in room at time of event. Did not hit head. No pain anywhere. Feels a little lightheaded.     Vitals:    24 1340   BP: 108/64   Pulse: 63   Resp: 16   Temp:    SpO2:      Plan:  - 500cc NS bolus  - PT/OT eval  - Hold discharge. Primary attending notified.     Electronically signed by Pedro Luis Dawson DO on 24 at 1:48 PM

## 2024-09-01 NOTE — DISCHARGE SUMMARY
Discharge Diagnosis  PNES  2. Medical non compliance  3. COVID 19 asymptomatic    Issues Requiring Follow-Up  Pcp, psych and neurology    Discharge Meds     Your medication list        START taking these medications        Instructions Last Dose Given Next Dose Due   levETIRAcetam 1,000 mg tablet  Commonly known as: Keppra      Take 1.5 tablets (1,500 mg) by mouth 2 times a day.              CONTINUE taking these medications        Instructions Last Dose Given Next Dose Due   lamoTRIgine 150 mg tablet  Commonly known as: LaMICtal      Take 1 tablet (150 mg) by mouth 2 times a day.       zonisamide 100 mg capsule  Commonly known as: Zonegran                  STOP taking these medications      amLODIPine 2.5 mg tablet  Commonly known as: Norvasc        aspirin 81 mg EC tablet        atorvastatin 40 mg tablet  Commonly known as: Lipitor        carvedilol 3.125 mg tablet  Commonly known as: Coreg        donepezil 10 mg tablet  Commonly known as: Aricept        DULoxetine 30 mg DR capsule  Commonly known as: Cymbalta        Effexor  mg 24 hr capsule  Generic drug: venlafaxine XR        famotidine 20 mg tablet  Commonly known as: Pepcid        fluticasone propion-salmeteroL 250-50 mcg/dose diskus inhaler  Commonly known as: Advair Diskus        furosemide 20 mg tablet  Commonly known as: Lasix        mirtazapine 7.5 mg tablet  Commonly known as: Remeron        pantoprazole 40 mg EC tablet  Commonly known as: ProtoNix        potassium chloride CR 10 mEq ER tablet  Commonly known as: Klor-Con        QUEtiapine 25 mg tablet  Commonly known as: SEROquel        rOPINIRole 4 mg tablet  Commonly known as: Requip        sertraline 100 mg tablet  Commonly known as: Zoloft        Xarelto 20 mg tablet  Generic drug: rivaroxaban                  Where to Get Your Medications        These medications were sent to [place] [id number] [place] [facility] [address] [facility]  [address] [facility] [address]      Phone:  [phone]   lamoTRIgine 150 mg tablet  levETIRAcetam 1,000 mg tablet         Test Results Pending At Discharge  Pending Labs       No current pending labs.            Hospital Course   Patient is a 69-year-old male who is visiting his brother from [place] was having staring spells therefore he was brought into the ED.  Patient has had a routine EEG done here which did not show any epileptiform activity patient continued to have staring spells therefore he had a 24-hour video EEG performed during this he also did not have any epileptiform activity.  Likely this is nonepileptic.  May be psychological as discussed with the brother patient is very noncompliant with taking his medications.  I did discharge the patient on Keppra and his home dose of lacosamide as he was on prior to.  Will need to follow-up with neurology and psych.  Per the brother who he is visiting states he does not take any of his medications I did alert that Adult Protective Services should be notified in [place].  Patient was stable at the time of discharge.    Patient's discharge was delayed to September 22 as his knee buckled and he slid, was seen by PT OT recommended moderate intensity, patient opted to go home his brother is driving back to [place].    Time spent more than 30 minutes on discharge      Pertinent Physical Exam At Time of Discharge  Physical Exam  Vitals reviewed.   Constitutional:       Appearance: Normal appearance.   HENT:      Head: Normocephalic.      Nose: Nose normal.      Mouth/Throat:      Mouth: Mucous membranes are dry.   Cardiovascular:      Rate and Rhythm: Normal rate and regular rhythm.   Pulmonary:      Effort: Pulmonary effort is normal.   Abdominal:      General: Abdomen is flat. Bowel sounds are normal.      Palpations: Abdomen is soft.   Skin:     Capillary Refill: Capillary refill takes less than 2 seconds.   Neurological:      Mental Status: He is alert.         Outpatient Follow-Up  No future  appointments.      Susan Shelby MD

## 2024-09-01 NOTE — PROGRESS NOTES
Physical Therapy    Physical Therapy Evaluation    Patient Name: Shamir Buenrostro  MRN: [id number]  Today's Date: 9/1/2024   Time Calculation  Start Time: 1605  Stop Time: 1620  Time Calculation (min): 15 min    Assessment/Plan   PT Assessment  PT Assessment Results: Decreased strength, Decreased endurance, Impaired balance, Decreased mobility, Decreased coordination, Decreased safety awareness, Obesity, Pain  Rehab Prognosis: Good  Barriers to Discharge: patients medical condition  Evaluation/Treatment Tolerance: Patient tolerated treatment well, Patient limited by fatigue  Medical Staff Made Aware: Yes  Strengths: Ability to acquire knowledge, Attitude of self  Barriers to Participation: Comorbidities  End of Session Communication: Bedside nurse  End of Session Patient Position: Bed, 3 rail up, Alarm on  IP OR SWING BED PT PLAN  Inpatient or Swing Bed: Inpatient  PT Plan  Treatment/Interventions: Bed mobility, Transfer training, Gait training, Stair training, Balance training, Neuromuscular re-education, Strengthening, Endurance training, Range of motion, Therapeutic exercise, Therapeutic activity, Home exercise program, Positioning, Postural re-education  PT Plan: Ongoing PT  PT Frequency: 3 times per week  PT Discharge Recommendations: Moderate intensity level of continued care  Equipment Recommended upon Discharge: Wheeled walker  PT Recommended Transfer Status: Assist x1  PT - OK to Discharge: Yes      Subjective   General Visit Information:  General  Reason for Referral: COVID, seizure , muscle weakness , difficulty with mobility  Referred By: Susan Shelby MD  Past Medical History Relevant to Rehab: Rapid response called for patient fall. He slid out of bed while trying to get dressed for discharge. He was unable to get up on his own. Needed assistance from 4 RN to get back in bed.  RN was in room at time of event. Did not hit head. No pain anywhere. Feels a little lightheaded. Patient was initially admitted  with seizure and had COVID  Family/Caregiver Present: No  Prior to Session Communication: Bedside nurse  Patient Position Received: Bed, 3 rail up, Alarm off, not on at start of session  Preferred Learning Style: auditory, kinesthetic, verbal  Home Living:  Home Living  Type of Home: House  Lives With: Spouse  Home Adaptive Equipment: Walker rolling or standard  Home Layout: One level  Home Access: Stairs to enter without rails  Prior Level of Function:  Prior Function Per Pt/Caregiver Report  Level of [place]: Independent with homemaking with ambulation  Receives Help From: Family  Ambulatory Assistance: Independent  Precautions:       Vital Signs (Past 2hrs)        Date/Time Vitals Session Patient Position Pulse Resp SpO2 BP MAP (mmHg)    09/01/24 1643 --  --  55  16  99 %  133/77  --                         Objective   Pain:  Pain Assessment  Pain Assessment: 0-10  0-10 (Numeric) Pain Score: 2  Pain Location: Knee  Pain Orientation: Right, Left  Pain Descriptors: Aching  Pain Frequency: Intermittent  Pain Onset: Ongoing  Clinical Progression: Gradually improving  Patient's Stated Pain Goal: 1  Pain Interventions: Repositioned, Ambulation/increased activity  Cognition:  Cognition  Orientation Level: Oriented X4    General Assessments:  General Observation  General Observation: patient was lyiing in bed , agreebale for physical therapy               Activity Tolerance  Endurance: Tolerates less than 10 min exercise, no significant change in vital signs    Sensation  Light Touch: No apparent deficits    Strength  Strength Comments: BLE 3+/5 grossly            Static Standing Balance  Static Standing-Balance Support: Bilateral upper extremity supported  Static Standing-Level of Assistance: Moderate assistance  Static Standing-Comment/Number of Minutes: fair - , patient was dizzy in 2 mins  Functional Assessments:  Bed Mobility  Bed Mobility: Yes  Bed Mobility 1  Bed Mobility 1: Supine to sitting, Sitting to  supine  Level of Assistance 1: Moderate assistance    Transfers  Transfer: Yes  Transfer 1  Transfer From 1: Bed to, Sit to  Transfer to 1: Stand  Technique 1: Sit to stand, Stand to sit  Transfer Device 1: Gait belt, Walker  Transfer Level of Assistance 1: Moderate assistance    Ambulation/Gait Training  Ambulation/Gait Training Performed: No (patient was dizzy upon standing)  Extremity/Trunk Assessments:  RLE   RLE : Exceptions to WFL  Strength RLE  RLE Overall Strength: Greater than or equal to 3/5 as evidenced by functional mobility  LLE   LLE : Exceptions to WFL  Strength LLE  LLE Overall Strength: Greater than or equal to 3/5 as evidenced by functional mobility  Outcome Measures:  [facility] Basic Mobility  Turning from your back to your side while in a flat bed without using bedrails: A lot  Moving from lying on your back to sitting on the side of a flat bed without using bedrails: A lot  Moving to and from bed to chair (including a wheelchair): A lot  Standing up from a chair using your arms (e.g. wheelchair or bedside chair): Total  To walk in hospital room: Total  Climbing 3-5 steps with railing: Total  Basic Mobility - Total Score: 9    Encounter Problems       Encounter Problems (Active)       Balance       STG - Maintains good dynamic standing balance with upper extremity support with LRAD (Progressing)       Start:  09/01/24    Expected End:  09/15/24       INTERVENTIONS:  1. Practice standing with minimal support.  2. Educate patient about standing tolerance.  3. Educate patient about independence with gait, transfers, and ADL's.  4. Educate patient about use of assistive device.  5. Educate patient about self-directed care.         STG - Maintains good static standing balance with upper extremity support with LRAD (Progressing)       Start:  09/01/24    Expected End:  09/15/24       INTERVENTIONS:  1. Practice standing with minimal support.  2. Educate patient about standing tolerance.  3. Educate patient  about independence with gait, transfers, and ADL's.  4. Educate patient about use of assistive device.  5. Educate patient about self-directed care.            Mobility       STG - Patient will ambulate > = 30 feet with min assist with LRAD  (Progressing)       Start:  09/01/24    Expected End:  09/15/24               PT Transfers       STG - Transfer from bed to chair with min assist with LRAD  (Progressing)       Start:  09/01/24    Expected End:  09/15/24               Pain              Education Documentation  Handouts, taught by Rosas Judd, PT at 9/1/2024  5:06 PM.  Learner: Patient  Readiness: Acceptance  Method: Explanation, Demonstration  Response: Verbalizes Understanding, Needs Reinforcement    Precautions, taught by Rosas Judd PT at 9/1/2024  5:06 PM.  Learner: Patient  Readiness: Acceptance  Method: Explanation, Demonstration  Response: Verbalizes Understanding, Needs Reinforcement    Body Mechanics, taught by Rosas Judd PT at 9/1/2024  5:06 PM.  Learner: Patient  Readiness: Acceptance  Method: Explanation, Demonstration  Response: Verbalizes Understanding, Needs Reinforcement    Home Exercise Program, taught by Rosas Judd PT at 9/1/2024  5:06 PM.  Learner: Patient  Readiness: Acceptance  Method: Explanation, Demonstration  Response: Verbalizes Understanding, Needs Reinforcement    Mobility Training, taught by Rosas Judd PT at 9/1/2024  5:06 PM.  Learner: Patient  Readiness: Acceptance  Method: Explanation, Demonstration  Response: Verbalizes Understanding, Needs Reinforcement    Education Comments  No comments found.

## 2024-09-01 NOTE — DISCHARGE INSTRUCTIONS
Seizure precautions:  No driving until seizure free x 6 months.  No working on heights x 6 months.  No swimming or bathing x 6 months.  No sharp object use x 6 months.     You need to inform the [facility] about the seizure event and surrender license per their discretion - until seizure free x 6 months and cleared by neurology.     Please practice common sense measures to avoid bodily harm and injury.     Do not consume alcohol, drugs and should not be smoking. It is also important to maintain adequate dietary intake and, adequate sleep hours to help reduce seizure risk.

## 2024-09-01 NOTE — CARE PLAN
Problem: Fall/Injury  Goal: Not fall by end of shift  Outcome: Progressing  Goal: Be free from injury by end of the shift  Outcome: Progressing  Goal: Verbalize understanding of personal risk factors for fall in the hospital  Outcome: Progressing  Goal: Verbalize understanding of risk factor reduction measures to prevent injury from fall in the home  Outcome: Progressing  Goal: Use assistive devices by end of the shift  Outcome: Progressing  Goal: Pace activities to prevent fatigue by end of the shift  Outcome: Progressing     Problem: Skin  Goal: Decreased wound size/increased tissue granulation at next dressing change  Outcome: Progressing  Flowsheets (Taken 8/30/2024 1425 by Huyen Berrios, RN)  Decreased wound size/increased tissue granulation at next dressing change: Promote sleep for wound healing  Goal: Participates in plan/prevention/treatment measures  Outcome: Progressing  Flowsheets (Taken 8/30/2024 1425 by Huyen Berrios RN)  Participates in plan/prevention/treatment measures: Elevate heels  Goal: Prevent/manage excess moisture  Outcome: Progressing  Flowsheets (Taken 9/1/2024 1551)  Prevent/manage excess moisture: Cleanse incontinence/protect with barrier cream  Goal: Prevent/minimize sheer/friction injuries  Outcome: Progressing  Flowsheets (Taken 9/1/2024 1551)  Prevent/minimize sheer/friction injuries: Use pull sheet  Goal: Promote/optimize nutrition  Outcome: Progressing  Flowsheets (Taken 9/1/2024 1551)  Promote/optimize nutrition: Consume > 50% meals/supplements  Goal: Promote skin healing  Outcome: Progressing  Flowsheets (Taken 9/1/2024 1551)  Promote skin healing: Assess skin/pad under line(s)/device(s)     Problem: Pain - Adult  Goal: Verbalizes/displays adequate comfort level or baseline comfort level  Outcome: Progressing     Problem: Safety - Adult  Goal: Free from fall injury  Outcome: Progressing     Problem: Discharge Planning  Goal: Discharge to home or other facility with  appropriate resources  Outcome: Progressing     Problem: Chronic Conditions and Co-morbidities  Goal: Patient's chronic conditions and co-morbidity symptoms are monitored and maintained or improved  Outcome: Progressing     Problem: Infective Meningitis/Encephalitis  Goal: Ansence or control of seizures  Outcome: Progressing  Goal: Neuro status stable or improved  Outcome: Progressing  Goal: No further progression of infection  Outcome: Progressing  Goal: No signs of neurosensory compromise  Outcome: Progressing  Goal: No signs of respiratory compromise  Outcome: Progressing  Goal: Tolerates invasive procedures  Outcome: Progressing     Problem: Seizures  Goal: Absence or minimized seizure activity  Outcome: Progressing  Goal: Freedom from injury  Outcome: Progressing  Goal: Intact skin surrounding leads  Outcome: Progressing  Goal: No signs of respiratory or cardiac compromise  Outcome: Progressing  Goal: Protection of airway  Outcome: Progressing   The patient's goals for the shift include maintaining pt safety      The clinical goals for the shift include pt will remain free of falls through out the shift

## 2024-09-02 VITALS
DIASTOLIC BLOOD PRESSURE: 75 MMHG | RESPIRATION RATE: 15 BRPM | BODY MASS INDEX: 43.89 KG/M2 | WEIGHT: 296.3 LBS | SYSTOLIC BLOOD PRESSURE: 134 MMHG | HEIGHT: 69 IN | OXYGEN SATURATION: 97 % | HEART RATE: 58 BPM | TEMPERATURE: 97.8 F

## 2024-09-02 PROCEDURE — 2500000004 HC RX 250 GENERAL PHARMACY W/ HCPCS (ALT 636 FOR OP/ED): Performed by: PSYCHIATRY & NEUROLOGY

## 2024-09-02 PROCEDURE — 94640 AIRWAY INHALATION TREATMENT: CPT

## 2024-09-02 PROCEDURE — 2500000002 HC RX 250 W HCPCS SELF ADMINISTERED DRUGS (ALT 637 FOR MEDICARE OP, ALT 636 FOR OP/ED): Performed by: INTERNAL MEDICINE

## 2024-09-02 PROCEDURE — 2500000001 HC RX 250 WO HCPCS SELF ADMINISTERED DRUGS (ALT 637 FOR MEDICARE OP): Performed by: INTERNAL MEDICINE

## 2024-09-02 RX ADMIN — DULOXETINE HYDROCHLORIDE 30 MG: 30 CAPSULE, DELAYED RELEASE ORAL at 09:56

## 2024-09-02 RX ADMIN — CARVEDILOL 3.12 MG: 3.12 TABLET, FILM COATED ORAL at 09:57

## 2024-09-02 RX ADMIN — PANTOPRAZOLE SODIUM 40 MG: 40 TABLET, DELAYED RELEASE ORAL at 05:07

## 2024-09-02 RX ADMIN — LAMOTRIGINE 150 MG: 100 TABLET ORAL at 09:56

## 2024-09-02 RX ADMIN — FLUTICASONE FUROATE AND VILANTEROL TRIFENATATE 1 PUFF: 200; 25 POWDER RESPIRATORY (INHALATION) at 07:50

## 2024-09-02 RX ADMIN — ASPIRIN 81 MG: 81 TABLET, COATED ORAL at 09:56

## 2024-09-02 RX ADMIN — ZONISAMIDE 100 MG: 100 CAPSULE ORAL at 09:56

## 2024-09-02 RX ADMIN — LEVETIRACETAM 1500 MG: 15 INJECTION INTRAVENOUS at 05:07

## 2024-09-02 RX ADMIN — ATORVASTATIN CALCIUM 40 MG: 40 TABLET, FILM COATED ORAL at 09:56

## 2024-09-02 ASSESSMENT — COGNITIVE AND FUNCTIONAL STATUS - GENERAL
WALKING IN HOSPITAL ROOM: TOTAL
HELP NEEDED FOR BATHING: TOTAL
MOVING FROM LYING ON BACK TO SITTING ON SIDE OF FLAT BED WITH BEDRAILS: A LOT
TOILETING: TOTAL
EATING MEALS: A LITTLE
DAILY ACTIVITIY SCORE: 9
MOBILITY SCORE: 10
DRESSING REGULAR LOWER BODY CLOTHING: TOTAL
CLIMB 3 TO 5 STEPS WITH RAILING: TOTAL
STANDING UP FROM CHAIR USING ARMS: A LOT
PERSONAL GROOMING: A LOT
TURNING FROM BACK TO SIDE WHILE IN FLAT BAD: A LOT
DRESSING REGULAR UPPER BODY CLOTHING: TOTAL
MOVING TO AND FROM BED TO CHAIR: A LOT

## 2024-09-02 ASSESSMENT — PAIN SCALES - GENERAL: PAINLEVEL_OUTOF10: 0 - NO PAIN

## 2024-09-02 ASSESSMENT — PAIN - FUNCTIONAL ASSESSMENT: PAIN_FUNCTIONAL_ASSESSMENT: 0-10

## 2024-09-02 NOTE — PROGRESS NOTES
09/02/24 0958   Discharge Planning   Expected Discharge Disposition Home        Revisited patient at bedside and explained to patient about PT/OT rec for mod and SNF placement for rehab. Patient refused and stated he is going home with his brother and after that going home back to [place] where he lives. Dr. Shelby made aware.

## 2024-09-02 NOTE — NURSING NOTE
Patient to wheelchair with assistance of 2 staff, educated patient and family to sit for few min, then, stand, then move to avoid dizziness and any falls on the drive to [place] today. Discharged home with family. Family has AVS reviewed with them by a previous discharge nurse. Patient states has all belongings. Patient is awake alert oriented x4, slow verbal response at times

## 2024-09-02 NOTE — CARE PLAN
The patient's goals for the shift include  no seizures    The clinical goals for the shift include patient will be free of falls    Problem: Skin  Goal: Decreased wound size/increased tissue granulation at next dressing change  Outcome: Progressing  Goal: Participates in plan/prevention/treatment measures  Outcome: Progressing  Goal: Prevent/manage excess moisture  Outcome: Progressing  Goal: Prevent/minimize sheer/friction injuries  Outcome: Progressing  Goal: Promote/optimize nutrition  Outcome: Progressing  Goal: Promote skin healing  Outcome: Progressing     Problem: Pain - Adult  Goal: Verbalizes/displays adequate comfort level or baseline comfort level  Outcome: Progressing     Problem: Safety - Adult  Goal: Free from fall injury  Outcome: Progressing     Problem: Discharge Planning  Goal: Discharge to home or other facility with appropriate resources  Outcome: Progressing     Problem: Chronic Conditions and Co-morbidities  Goal: Patient's chronic conditions and co-morbidity symptoms are monitored and maintained or improved  Outcome: Progressing     Problem: Pain  Goal: STG - Pain is manageable through therapies  Outcome: Progressing